# Patient Record
Sex: MALE | Race: WHITE | Employment: FULL TIME | URBAN - METROPOLITAN AREA
[De-identification: names, ages, dates, MRNs, and addresses within clinical notes are randomized per-mention and may not be internally consistent; named-entity substitution may affect disease eponyms.]

---

## 2017-05-24 PROBLEM — R00.2 PALPITATIONS: Status: ACTIVE | Noted: 2017-05-24

## 2017-05-24 PROBLEM — E78.5 DYSLIPIDEMIA: Status: ACTIVE | Noted: 2017-05-24

## 2017-06-29 PROBLEM — R06.81 WITNESSED APNEIC SPELLS: Status: ACTIVE | Noted: 2017-06-29

## 2017-06-29 PROBLEM — Z12.5 PROSTATE CANCER SCREENING: Status: ACTIVE | Noted: 2017-06-29

## 2017-06-29 PROBLEM — E79.0 HYPERURICEMIA: Status: ACTIVE | Noted: 2017-06-29

## 2017-06-29 PROBLEM — Z00.00 WELL ADULT HEALTH CHECK: Status: ACTIVE | Noted: 2017-06-29

## 2022-01-01 ENCOUNTER — HOME CARE VISIT (OUTPATIENT)
Dept: HOSPICE | Facility: HOSPICE | Age: 62
End: 2022-01-01
Payer: COMMERCIAL

## 2022-01-01 ENCOUNTER — HOME CARE VISIT (OUTPATIENT)
Dept: SCHEDULING | Facility: HOME HEALTH | Age: 62
End: 2022-01-01
Payer: COMMERCIAL

## 2022-01-01 ENCOUNTER — HOSPITAL ENCOUNTER (INPATIENT)
Age: 62
LOS: 6 days | End: 2022-04-26
Attending: INTERNAL MEDICINE | Admitting: INTERNAL MEDICINE

## 2022-01-01 ENCOUNTER — HOSPICE ADMISSION (OUTPATIENT)
Dept: HOSPICE | Facility: HOSPICE | Age: 62
End: 2022-01-01
Payer: COMMERCIAL

## 2022-01-01 VITALS
OXYGEN SATURATION: 96 % | HEART RATE: 118 BPM | DIASTOLIC BLOOD PRESSURE: 70 MMHG | HEIGHT: 69 IN | TEMPERATURE: 97.8 F | WEIGHT: 174 LBS | BODY MASS INDEX: 25.77 KG/M2 | SYSTOLIC BLOOD PRESSURE: 110 MMHG | RESPIRATION RATE: 17 BRPM

## 2022-01-01 VITALS
DIASTOLIC BLOOD PRESSURE: 76 MMHG | RESPIRATION RATE: 24 BRPM | TEMPERATURE: 97.4 F | HEART RATE: 105 BPM | SYSTOLIC BLOOD PRESSURE: 106 MMHG

## 2022-01-01 VITALS
TEMPERATURE: 97.9 F | SYSTOLIC BLOOD PRESSURE: 110 MMHG | RESPIRATION RATE: 17 BRPM | DIASTOLIC BLOOD PRESSURE: 72 MMHG | HEART RATE: 88 BPM

## 2022-01-01 VITALS
HEART RATE: 70 BPM | SYSTOLIC BLOOD PRESSURE: 102 MMHG | RESPIRATION RATE: 18 BRPM | DIASTOLIC BLOOD PRESSURE: 60 MMHG | TEMPERATURE: 97.7 F

## 2022-01-01 DIAGNOSIS — G62.0 PERIPHERAL NEUROPATHY DUE TO AND NOT CONCURRENT WITH CHEMOTHERAPY (HCC): ICD-10-CM

## 2022-01-01 DIAGNOSIS — C78.7 HEPATIC METASTASES (HCC): ICD-10-CM

## 2022-01-01 DIAGNOSIS — R64 MALIGNANT CACHEXIA (HCC): ICD-10-CM

## 2022-01-01 DIAGNOSIS — R53.81 PHYSICAL DEBILITY: ICD-10-CM

## 2022-01-01 DIAGNOSIS — R10.10 PAIN OF UPPER ABDOMEN: ICD-10-CM

## 2022-01-01 DIAGNOSIS — R11.2 REFRACTORY NAUSEA AND VOMITING: Primary | ICD-10-CM

## 2022-01-01 DIAGNOSIS — C78.89 METASTATIC ADENOCARCINOMA TO PANCREAS (HCC): ICD-10-CM

## 2022-01-01 DIAGNOSIS — C48.2 PERITONEAL CARCINOMA (HCC): ICD-10-CM

## 2022-01-01 DIAGNOSIS — T45.1X5S PERIPHERAL NEUROPATHY DUE TO AND NOT CONCURRENT WITH CHEMOTHERAPY (HCC): ICD-10-CM

## 2022-01-01 DIAGNOSIS — R18.0 MALIGNANT ASCITES: ICD-10-CM

## 2022-01-01 DIAGNOSIS — R00.2 PALPITATIONS: ICD-10-CM

## 2022-01-01 DIAGNOSIS — C79.51 METASTASIS TO BONE (HCC): ICD-10-CM

## 2022-01-01 PROCEDURE — 74011250637 HC RX REV CODE- 250/637: Performed by: INTERNAL MEDICINE

## 2022-01-01 PROCEDURE — 0651 HSPC ROUTINE HOME CARE

## 2022-01-01 PROCEDURE — 74011250636 HC RX REV CODE- 250/636: Performed by: INTERNAL MEDICINE

## 2022-01-01 PROCEDURE — 0656 HSPC GENERAL INPATIENT

## 2022-01-01 PROCEDURE — 74011000250 HC RX REV CODE- 250: Performed by: INTERNAL MEDICINE

## 2022-01-01 PROCEDURE — 3336500001 HSPC ELECTION

## 2022-01-01 PROCEDURE — 74011000250 HC RX REV CODE- 250: Performed by: NURSE PRACTITIONER

## 2022-01-01 PROCEDURE — G0299 HHS/HOSPICE OF RN EA 15 MIN: HCPCS

## 2022-01-01 PROCEDURE — 74011250637 HC RX REV CODE- 250/637: Performed by: NURSE PRACTITIONER

## 2022-01-01 PROCEDURE — 74011250636 HC RX REV CODE- 250/636: Performed by: NURSE PRACTITIONER

## 2022-01-01 PROCEDURE — G0155 HHCP-SVS OF CSW,EA 15 MIN: HCPCS

## 2022-01-01 PROCEDURE — HOSPICE MEDICATION HC HH HOSPICE MEDICATION

## 2022-01-01 RX ORDER — HALOPERIDOL 5 MG/ML
4 INJECTION INTRAMUSCULAR EVERY 8 HOURS
Status: DISCONTINUED | OUTPATIENT
Start: 2022-01-01 | End: 2022-04-27 | Stop reason: HOSPADM

## 2022-01-01 RX ORDER — HALOPERIDOL 5 MG/ML
4 INJECTION INTRAMUSCULAR ONCE
Status: COMPLETED | OUTPATIENT
Start: 2022-01-01 | End: 2022-01-01

## 2022-01-01 RX ORDER — METOCLOPRAMIDE 10 MG/1
5 TABLET ORAL
Status: DISCONTINUED | OUTPATIENT
Start: 2022-01-01 | End: 2022-01-01

## 2022-01-01 RX ORDER — ONDANSETRON 2 MG/ML
4 INJECTION INTRAMUSCULAR; INTRAVENOUS
Status: DISCONTINUED | OUTPATIENT
Start: 2022-01-01 | End: 2022-04-27 | Stop reason: HOSPADM

## 2022-01-01 RX ORDER — FACIAL-BODY WIPES
10 EACH TOPICAL AS NEEDED
Status: DISCONTINUED | OUTPATIENT
Start: 2022-01-01 | End: 2022-04-27 | Stop reason: HOSPADM

## 2022-01-01 RX ORDER — SODIUM CHLORIDE 0.9 % (FLUSH) 0.9 %
10 SYRINGE (ML) INJECTION EVERY 12 HOURS
Status: DISCONTINUED | OUTPATIENT
Start: 2022-01-01 | End: 2022-01-01 | Stop reason: DRUGHIGH

## 2022-01-01 RX ORDER — PANTOPRAZOLE SODIUM 40 MG/1
40 TABLET, DELAYED RELEASE ORAL
Status: DISCONTINUED | OUTPATIENT
Start: 2022-01-01 | End: 2022-01-01

## 2022-01-01 RX ORDER — METOCLOPRAMIDE HYDROCHLORIDE 5 MG/ML
5 INJECTION INTRAMUSCULAR; INTRAVENOUS EVERY 6 HOURS
Status: DISCONTINUED | OUTPATIENT
Start: 2022-01-01 | End: 2022-01-01

## 2022-01-01 RX ORDER — HYOSCYAMINE SULFATE 0.12 MG/ML
250 LIQUID ORAL
Status: DISCONTINUED | OUTPATIENT
Start: 2022-01-01 | End: 2022-01-01

## 2022-01-01 RX ORDER — SODIUM CHLORIDE 0.9 % (FLUSH) 0.9 %
10 SYRINGE (ML) INJECTION AS NEEDED
Status: DISCONTINUED | OUTPATIENT
Start: 2022-01-01 | End: 2022-04-27 | Stop reason: HOSPADM

## 2022-01-01 RX ORDER — SENNOSIDES 8.6 MG/1
2 TABLET ORAL 2 TIMES DAILY
Status: DISCONTINUED | OUTPATIENT
Start: 2022-01-01 | End: 2022-01-01

## 2022-01-01 RX ORDER — FENTANYL 12.5 UG/1
1 PATCH TRANSDERMAL
Status: DISCONTINUED | OUTPATIENT
Start: 2022-01-01 | End: 2022-04-27 | Stop reason: HOSPADM

## 2022-01-01 RX ORDER — FACIAL-BODY WIPES
10 EACH TOPICAL ONCE
Status: COMPLETED | OUTPATIENT
Start: 2022-01-01 | End: 2022-01-01

## 2022-01-01 RX ORDER — HEPARIN 100 UNIT/ML
500 SYRINGE INTRAVENOUS ONCE
Status: COMPLETED | OUTPATIENT
Start: 2022-01-01 | End: 2022-01-01

## 2022-01-01 RX ORDER — SODIUM CHLORIDE 0.9 % (FLUSH) 0.9 %
10 SYRINGE (ML) INJECTION EVERY 8 HOURS
Status: DISCONTINUED | OUTPATIENT
Start: 2022-01-01 | End: 2022-04-27 | Stop reason: HOSPADM

## 2022-01-01 RX ORDER — HALOPERIDOL 5 MG/ML
4 INJECTION INTRAMUSCULAR
Status: DISCONTINUED | OUTPATIENT
Start: 2022-01-01 | End: 2022-04-27 | Stop reason: HOSPADM

## 2022-01-01 RX ORDER — HEPARIN 100 UNIT/ML
300 SYRINGE INTRAVENOUS AS NEEDED
Status: DISCONTINUED | OUTPATIENT
Start: 2022-01-01 | End: 2022-01-01 | Stop reason: DRUGHIGH

## 2022-01-01 RX ORDER — MAG HYDROX/ALUMINUM HYD/SIMETH 200-200-20
30 SUSPENSION, ORAL (FINAL DOSE FORM) ORAL
Status: DISCONTINUED | OUTPATIENT
Start: 2022-01-01 | End: 2022-01-01

## 2022-01-01 RX ORDER — ONDANSETRON HYDROCHLORIDE 8 MG/1
8 TABLET, FILM COATED ORAL
COMMUNITY
Start: 2021-01-01 | End: 2022-04-27 | Stop reason: HOSPADM

## 2022-01-01 RX ORDER — NALOXEGOL OXALATE 25 MG/1
1 TABLET, FILM COATED ORAL
COMMUNITY
Start: 2022-01-01 | End: 2022-04-27 | Stop reason: HOSPADM

## 2022-01-01 RX ORDER — FENTANYL 25 UG/1
1 PATCH TRANSDERMAL
Status: DISCONTINUED | OUTPATIENT
Start: 2022-01-01 | End: 2022-01-01

## 2022-01-01 RX ORDER — HYDROMORPHONE HYDROCHLORIDE 1 MG/ML
1 INJECTION, SOLUTION INTRAMUSCULAR; INTRAVENOUS; SUBCUTANEOUS
Status: DISCONTINUED | OUTPATIENT
Start: 2022-01-01 | End: 2022-04-27 | Stop reason: HOSPADM

## 2022-01-01 RX ORDER — LORAZEPAM 2 MG/ML
1 INJECTION INTRAMUSCULAR ONCE
Status: COMPLETED | OUTPATIENT
Start: 2022-01-01 | End: 2022-01-01

## 2022-01-01 RX ORDER — CARBOXYMETHYLCELLULOSE SODIUM 10 MG/ML
1 GEL OPHTHALMIC AS NEEDED
Status: DISCONTINUED | OUTPATIENT
Start: 2022-01-01 | End: 2022-04-27 | Stop reason: HOSPADM

## 2022-01-01 RX ORDER — SODIUM CHLORIDE 0.9 % (FLUSH) 0.9 %
3 SYRINGE (ML) INJECTION EVERY 8 HOURS
Status: DISCONTINUED | OUTPATIENT
Start: 2022-01-01 | End: 2022-01-01

## 2022-01-01 RX ORDER — HEPARIN 100 UNIT/ML
300 SYRINGE INTRAVENOUS EVERY 12 HOURS
Status: DISCONTINUED | OUTPATIENT
Start: 2022-01-01 | End: 2022-01-01 | Stop reason: DRUGHIGH

## 2022-01-01 RX ORDER — DEXAMETHASONE SODIUM PHOSPHATE 4 MG/ML
4 INJECTION, SOLUTION INTRA-ARTICULAR; INTRALESIONAL; INTRAMUSCULAR; INTRAVENOUS; SOFT TISSUE EVERY 8 HOURS
Status: DISCONTINUED | OUTPATIENT
Start: 2022-01-01 | End: 2022-04-27 | Stop reason: HOSPADM

## 2022-01-01 RX ORDER — FENTANYL 25 UG/1
1 PATCH TRANSDERMAL
Status: DISCONTINUED | OUTPATIENT
Start: 2022-01-01 | End: 2022-04-27 | Stop reason: HOSPADM

## 2022-01-01 RX ORDER — DIPHENHYDRAMINE HYDROCHLORIDE 50 MG/ML
50 INJECTION, SOLUTION INTRAMUSCULAR; INTRAVENOUS
Status: DISCONTINUED | OUTPATIENT
Start: 2022-01-01 | End: 2022-01-01

## 2022-01-01 RX ORDER — HYDROMORPHONE HYDROCHLORIDE 1 MG/ML
1 INJECTION, SOLUTION INTRAMUSCULAR; INTRAVENOUS; SUBCUTANEOUS ONCE
Status: COMPLETED | OUTPATIENT
Start: 2022-01-01 | End: 2022-01-01

## 2022-01-01 RX ORDER — GLYCOPYRROLATE 0.2 MG/ML
0.2 INJECTION INTRAMUSCULAR; INTRAVENOUS
Status: DISCONTINUED | OUTPATIENT
Start: 2022-01-01 | End: 2022-04-27 | Stop reason: HOSPADM

## 2022-01-01 RX ADMIN — HALOPERIDOL LACTATE 4 MG: 5 INJECTION, SOLUTION INTRAMUSCULAR at 12:01

## 2022-01-01 RX ADMIN — SODIUM CHLORIDE, PRESERVATIVE FREE 10 ML: 5 INJECTION INTRAVENOUS at 12:00

## 2022-01-01 RX ADMIN — DIPHENHYDRAMINE HYDROCHLORIDE 50 MG: 50 INJECTION, SOLUTION INTRAMUSCULAR; INTRAVENOUS at 14:09

## 2022-01-01 RX ADMIN — SODIUM CHLORIDE, PRESERVATIVE FREE 10 ML: 5 INJECTION INTRAVENOUS at 20:53

## 2022-01-01 RX ADMIN — METOCLOPRAMIDE HYDROCHLORIDE 5 MG: 5 INJECTION INTRAMUSCULAR; INTRAVENOUS at 18:30

## 2022-01-01 RX ADMIN — HYDROMORPHONE HYDROCHLORIDE 1 MG: 1 INJECTION, SOLUTION INTRAMUSCULAR; INTRAVENOUS; SUBCUTANEOUS at 05:07

## 2022-01-01 RX ADMIN — Medication 3 ML: at 13:26

## 2022-01-01 RX ADMIN — SODIUM CHLORIDE, PRESERVATIVE FREE 10 ML: 5 INJECTION INTRAVENOUS at 11:31

## 2022-01-01 RX ADMIN — SODIUM CHLORIDE, PRESERVATIVE FREE 10 ML: 5 INJECTION INTRAVENOUS at 15:04

## 2022-01-01 RX ADMIN — HYDROMORPHONE HYDROCHLORIDE 1 MG: 1 INJECTION, SOLUTION INTRAMUSCULAR; INTRAVENOUS; SUBCUTANEOUS at 06:25

## 2022-01-01 RX ADMIN — METOCLOPRAMIDE HYDROCHLORIDE 5 MG: 5 INJECTION INTRAMUSCULAR; INTRAVENOUS at 06:11

## 2022-01-01 RX ADMIN — SODIUM CHLORIDE, PRESERVATIVE FREE 10 ML: 5 INJECTION INTRAVENOUS at 21:06

## 2022-01-01 RX ADMIN — SODIUM CHLORIDE, PRESERVATIVE FREE 10 ML: 5 INJECTION INTRAVENOUS at 08:21

## 2022-01-01 RX ADMIN — SODIUM CHLORIDE, PRESERVATIVE FREE 10 ML: 5 INJECTION INTRAVENOUS at 02:31

## 2022-01-01 RX ADMIN — SODIUM CHLORIDE, PRESERVATIVE FREE 10 ML: 5 INJECTION INTRAVENOUS at 11:56

## 2022-01-01 RX ADMIN — METOCLOPRAMIDE HYDROCHLORIDE 5 MG: 5 INJECTION INTRAMUSCULAR; INTRAVENOUS at 18:13

## 2022-01-01 RX ADMIN — SODIUM CHLORIDE, PRESERVATIVE FREE 10 ML: 5 INJECTION INTRAVENOUS at 08:22

## 2022-01-01 RX ADMIN — LORAZEPAM 1 MG: 2 INJECTION INTRAMUSCULAR; INTRAVENOUS at 15:03

## 2022-01-01 RX ADMIN — HYDROMORPHONE HYDROCHLORIDE 1 MG: 1 INJECTION, SOLUTION INTRAMUSCULAR; INTRAVENOUS; SUBCUTANEOUS at 15:09

## 2022-01-01 RX ADMIN — DEXAMETHASONE SODIUM PHOSPHATE 4 MG: 4 INJECTION, SOLUTION INTRAMUSCULAR; INTRAVENOUS at 21:54

## 2022-01-01 RX ADMIN — SODIUM CHLORIDE, PRESERVATIVE FREE 10 ML: 5 INJECTION INTRAVENOUS at 16:32

## 2022-01-01 RX ADMIN — Medication 3 ML: at 21:07

## 2022-01-01 RX ADMIN — DEXAMETHASONE SODIUM PHOSPHATE 4 MG: 4 INJECTION, SOLUTION INTRAMUSCULAR; INTRAVENOUS at 21:21

## 2022-01-01 RX ADMIN — SODIUM CHLORIDE, PRESERVATIVE FREE 10 ML: 5 INJECTION INTRAVENOUS at 18:27

## 2022-01-01 RX ADMIN — DEXAMETHASONE SODIUM PHOSPHATE 4 MG: 4 INJECTION, SOLUTION INTRAMUSCULAR; INTRAVENOUS at 06:04

## 2022-01-01 RX ADMIN — SODIUM CHLORIDE, PRESERVATIVE FREE 10 ML: 5 INJECTION INTRAVENOUS at 06:04

## 2022-01-01 RX ADMIN — HALOPERIDOL LACTATE 4 MG: 5 INJECTION, SOLUTION INTRAMUSCULAR at 02:31

## 2022-01-01 RX ADMIN — METOCLOPRAMIDE 5 MG: 10 TABLET ORAL at 10:48

## 2022-01-01 RX ADMIN — DEXAMETHASONE SODIUM PHOSPHATE 4 MG: 4 INJECTION, SOLUTION INTRAMUSCULAR; INTRAVENOUS at 14:42

## 2022-01-01 RX ADMIN — HYDROMORPHONE HYDROCHLORIDE 1 MG: 1 INJECTION, SOLUTION INTRAMUSCULAR; INTRAVENOUS; SUBCUTANEOUS at 17:31

## 2022-01-01 RX ADMIN — HALOPERIDOL LACTATE 4 MG: 5 INJECTION, SOLUTION INTRAMUSCULAR at 12:53

## 2022-01-01 RX ADMIN — BISACODYL 10 MG: 10 SUPPOSITORY RECTAL at 12:23

## 2022-01-01 RX ADMIN — HALOPERIDOL LACTATE 4 MG: 5 INJECTION, SOLUTION INTRAMUSCULAR at 16:32

## 2022-01-01 RX ADMIN — SODIUM CHLORIDE, PRESERVATIVE FREE 10 ML: 5 INJECTION INTRAVENOUS at 00:14

## 2022-01-01 RX ADMIN — DEXAMETHASONE SODIUM PHOSPHATE 4 MG: 4 INJECTION, SOLUTION INTRAMUSCULAR; INTRAVENOUS at 15:23

## 2022-01-01 RX ADMIN — HALOPERIDOL LACTATE 4 MG: 5 INJECTION, SOLUTION INTRAMUSCULAR at 21:05

## 2022-01-01 RX ADMIN — SODIUM CHLORIDE, PRESERVATIVE FREE 10 ML: 5 INJECTION INTRAVENOUS at 15:24

## 2022-01-01 RX ADMIN — HYDROMORPHONE HYDROCHLORIDE 1 MG: 1 INJECTION, SOLUTION INTRAMUSCULAR; INTRAVENOUS; SUBCUTANEOUS at 15:03

## 2022-01-01 RX ADMIN — DEXAMETHASONE SODIUM PHOSPHATE 4 MG: 4 INJECTION, SOLUTION INTRAMUSCULAR; INTRAVENOUS at 21:06

## 2022-01-01 RX ADMIN — Medication 3 ML: at 15:00

## 2022-01-01 RX ADMIN — HALOPERIDOL LACTATE 4 MG: 5 INJECTION, SOLUTION INTRAMUSCULAR at 04:58

## 2022-01-01 RX ADMIN — HALOPERIDOL LACTATE 4 MG: 5 INJECTION, SOLUTION INTRAMUSCULAR at 18:50

## 2022-01-01 RX ADMIN — SODIUM CHLORIDE, PRESERVATIVE FREE 10 ML: 5 INJECTION INTRAVENOUS at 03:45

## 2022-01-01 RX ADMIN — METOCLOPRAMIDE HYDROCHLORIDE 5 MG: 5 INJECTION INTRAMUSCULAR; INTRAVENOUS at 13:20

## 2022-01-01 RX ADMIN — HYDROMORPHONE HYDROCHLORIDE 1 MG: 1 INJECTION, SOLUTION INTRAMUSCULAR; INTRAVENOUS; SUBCUTANEOUS at 07:11

## 2022-01-01 RX ADMIN — HALOPERIDOL LACTATE 4 MG: 5 INJECTION, SOLUTION INTRAMUSCULAR at 20:53

## 2022-01-01 RX ADMIN — SODIUM CHLORIDE, PRESERVATIVE FREE 10 ML: 5 INJECTION INTRAVENOUS at 06:25

## 2022-01-01 RX ADMIN — SODIUM CHLORIDE, PRESERVATIVE FREE 10 ML: 5 INJECTION INTRAVENOUS at 14:42

## 2022-01-01 RX ADMIN — CARBOXYMETHYLCELLULOSE SODIUM 1 DROP: 10 GEL OPHTHALMIC at 15:12

## 2022-01-01 RX ADMIN — SODIUM CHLORIDE, PRESERVATIVE FREE 10 ML: 5 INJECTION INTRAVENOUS at 06:12

## 2022-01-01 RX ADMIN — SODIUM CHLORIDE, PRESERVATIVE FREE 10 ML: 5 INJECTION INTRAVENOUS at 07:12

## 2022-01-01 RX ADMIN — HYDROMORPHONE HYDROCHLORIDE 1 MG: 1 INJECTION, SOLUTION INTRAMUSCULAR; INTRAVENOUS; SUBCUTANEOUS at 11:53

## 2022-01-01 RX ADMIN — HYDROMORPHONE HYDROCHLORIDE 1 MG: 1 INJECTION, SOLUTION INTRAMUSCULAR; INTRAVENOUS; SUBCUTANEOUS at 20:47

## 2022-01-01 RX ADMIN — SODIUM CHLORIDE, PRESERVATIVE FREE 10 ML: 5 INJECTION INTRAVENOUS at 00:04

## 2022-01-01 RX ADMIN — DEXAMETHASONE SODIUM PHOSPHATE 4 MG: 4 INJECTION, SOLUTION INTRAMUSCULAR; INTRAVENOUS at 21:08

## 2022-01-01 RX ADMIN — PANTOPRAZOLE SODIUM 40 MG: 40 TABLET, DELAYED RELEASE ORAL at 16:59

## 2022-01-01 RX ADMIN — HALOPERIDOL LACTATE 4 MG: 5 INJECTION, SOLUTION INTRAMUSCULAR at 05:06

## 2022-01-01 RX ADMIN — SODIUM CHLORIDE, PRESERVATIVE FREE 10 ML: 5 INJECTION INTRAVENOUS at 04:59

## 2022-01-01 RX ADMIN — DEXAMETHASONE SODIUM PHOSPHATE 4 MG: 4 INJECTION, SOLUTION INTRAMUSCULAR; INTRAVENOUS at 21:12

## 2022-01-01 RX ADMIN — SENNOSIDES 17.2 MG: 8.6 TABLET, FILM COATED ORAL at 10:48

## 2022-01-01 RX ADMIN — SODIUM CHLORIDE, PRESERVATIVE FREE 300 UNITS: 5 INJECTION INTRAVENOUS at 20:49

## 2022-01-01 RX ADMIN — PANTOPRAZOLE SODIUM 40 MG: 40 TABLET, DELAYED RELEASE ORAL at 08:21

## 2022-01-01 RX ADMIN — SODIUM CHLORIDE, PRESERVATIVE FREE 10 ML: 5 INJECTION INTRAVENOUS at 15:12

## 2022-01-01 RX ADMIN — ONDANSETRON 4 MG: 2 INJECTION INTRAMUSCULAR; INTRAVENOUS at 18:26

## 2022-01-01 RX ADMIN — HALOPERIDOL LACTATE 4 MG: 5 INJECTION, SOLUTION INTRAMUSCULAR at 11:31

## 2022-01-01 RX ADMIN — DEXAMETHASONE SODIUM PHOSPHATE 4 MG: 4 INJECTION, SOLUTION INTRAMUSCULAR; INTRAVENOUS at 15:03

## 2022-01-01 RX ADMIN — SODIUM CHLORIDE, PRESERVATIVE FREE 300 UNITS: 5 INJECTION INTRAVENOUS at 12:23

## 2022-01-01 RX ADMIN — HYDROMORPHONE HYDROCHLORIDE 1 MG: 1 INJECTION, SOLUTION INTRAMUSCULAR; INTRAVENOUS; SUBCUTANEOUS at 05:14

## 2022-01-01 RX ADMIN — Medication 3 ML: at 05:07

## 2022-01-01 RX ADMIN — SODIUM CHLORIDE, PRESERVATIVE FREE 10 ML: 5 INJECTION INTRAVENOUS at 12:23

## 2022-01-01 RX ADMIN — SODIUM CHLORIDE, PRESERVATIVE FREE 300 UNITS: 5 INJECTION INTRAVENOUS at 14:12

## 2022-01-01 RX ADMIN — DEXAMETHASONE SODIUM PHOSPHATE 4 MG: 4 INJECTION, SOLUTION INTRAMUSCULAR; INTRAVENOUS at 15:17

## 2022-01-01 RX ADMIN — DEXAMETHASONE SODIUM PHOSPHATE 4 MG: 4 INJECTION, SOLUTION INTRAMUSCULAR; INTRAVENOUS at 05:00

## 2022-01-01 RX ADMIN — HYDROMORPHONE HYDROCHLORIDE 1 MG: 1 INJECTION, SOLUTION INTRAMUSCULAR; INTRAVENOUS; SUBCUTANEOUS at 21:06

## 2022-01-01 RX ADMIN — Medication 10 ML: at 05:54

## 2022-01-01 RX ADMIN — HALOPERIDOL LACTATE 4 MG: 5 INJECTION, SOLUTION INTRAMUSCULAR at 11:55

## 2022-01-01 RX ADMIN — Medication 10 ML: at 21:22

## 2022-01-01 RX ADMIN — DEXAMETHASONE SODIUM PHOSPHATE 4 MG: 4 INJECTION, SOLUTION INTRAMUSCULAR; INTRAVENOUS at 05:54

## 2022-01-01 RX ADMIN — SODIUM CHLORIDE, PRESERVATIVE FREE 300 UNITS: 5 INJECTION INTRAVENOUS at 08:22

## 2022-01-01 RX ADMIN — SODIUM CHLORIDE, PRESERVATIVE FREE 10 ML: 5 INJECTION INTRAVENOUS at 18:50

## 2022-01-01 RX ADMIN — HALOPERIDOL LACTATE 4 MG: 5 INJECTION, SOLUTION INTRAMUSCULAR at 03:45

## 2022-01-01 RX ADMIN — SODIUM CHLORIDE, PRESERVATIVE FREE 10 ML: 5 INJECTION INTRAVENOUS at 21:12

## 2022-01-01 RX ADMIN — SODIUM CHLORIDE, PRESERVATIVE FREE 10 ML: 5 INJECTION INTRAVENOUS at 17:31

## 2022-01-01 RX ADMIN — SODIUM CHLORIDE, PRESERVATIVE FREE 10 ML: 5 INJECTION INTRAVENOUS at 14:28

## 2022-01-01 RX ADMIN — Medication 3 ML: at 15:17

## 2022-01-01 RX ADMIN — HALOPERIDOL LACTATE 4 MG: 5 INJECTION, SOLUTION INTRAMUSCULAR at 12:08

## 2022-01-01 RX ADMIN — DEXAMETHASONE SODIUM PHOSPHATE 4 MG: 4 INJECTION, SOLUTION INTRAMUSCULAR; INTRAVENOUS at 14:28

## 2022-01-01 RX ADMIN — METOCLOPRAMIDE HYDROCHLORIDE 5 MG: 5 INJECTION INTRAMUSCULAR; INTRAVENOUS at 00:04

## 2022-01-01 RX ADMIN — METOCLOPRAMIDE HYDROCHLORIDE 5 MG: 5 INJECTION INTRAMUSCULAR; INTRAVENOUS at 06:04

## 2022-01-01 RX ADMIN — DEXAMETHASONE SODIUM PHOSPHATE 4 MG: 4 INJECTION, SOLUTION INTRAMUSCULAR; INTRAVENOUS at 06:11

## 2022-01-01 RX ADMIN — SODIUM CHLORIDE, PRESERVATIVE FREE 10 ML: 5 INJECTION INTRAVENOUS at 18:31

## 2022-01-01 RX ADMIN — HYDROMORPHONE HYDROCHLORIDE 1 MG: 1 INJECTION, SOLUTION INTRAMUSCULAR; INTRAVENOUS; SUBCUTANEOUS at 14:09

## 2022-01-01 RX ADMIN — Medication 10 ML: at 05:07

## 2022-01-01 RX ADMIN — HALOPERIDOL LACTATE 4 MG: 5 INJECTION, SOLUTION INTRAMUSCULAR at 21:06

## 2022-01-01 RX ADMIN — DIPHENHYDRAMINE HYDROCHLORIDE 50 MG: 50 INJECTION, SOLUTION INTRAMUSCULAR; INTRAVENOUS at 00:14

## 2022-01-01 RX ADMIN — METOCLOPRAMIDE HYDROCHLORIDE 5 MG: 5 INJECTION INTRAMUSCULAR; INTRAVENOUS at 11:59

## 2022-01-01 RX ADMIN — Medication 10 ML: at 21:54

## 2022-01-01 RX ADMIN — HALOPERIDOL LACTATE 4 MG: 5 INJECTION, SOLUTION INTRAMUSCULAR at 16:46

## 2022-01-01 RX ADMIN — DEXAMETHASONE SODIUM PHOSPHATE 4 MG: 4 INJECTION, SOLUTION INTRAMUSCULAR; INTRAVENOUS at 05:07

## 2022-01-01 RX ADMIN — DEXAMETHASONE SODIUM PHOSPHATE 4 MG: 4 INJECTION, SOLUTION INTRAMUSCULAR; INTRAVENOUS at 05:08

## 2022-01-01 RX ADMIN — Medication 500 UNITS: at 13:00

## 2022-01-01 RX ADMIN — SODIUM CHLORIDE, PRESERVATIVE FREE 10 ML: 5 INJECTION INTRAVENOUS at 16:47

## 2022-01-01 RX ADMIN — SODIUM CHLORIDE, PRESERVATIVE FREE 10 ML: 5 INJECTION INTRAVENOUS at 14:08

## 2022-01-01 RX ADMIN — SODIUM CHLORIDE, PRESERVATIVE FREE 10 ML: 5 INJECTION INTRAVENOUS at 05:13

## 2022-01-01 RX ADMIN — HYDROMORPHONE HYDROCHLORIDE 1 MG: 1 INJECTION, SOLUTION INTRAMUSCULAR; INTRAVENOUS; SUBCUTANEOUS at 20:53

## 2022-01-01 RX ADMIN — DEXAMETHASONE SODIUM PHOSPHATE 4 MG: 4 INJECTION, SOLUTION INTRAMUSCULAR; INTRAVENOUS at 21:19

## 2022-01-01 RX ADMIN — DEXAMETHASONE SODIUM PHOSPHATE 4 MG: 4 INJECTION, SOLUTION INTRAMUSCULAR; INTRAVENOUS at 13:26

## 2022-01-01 RX ADMIN — DEXAMETHASONE SODIUM PHOSPHATE 4 MG: 4 INJECTION, SOLUTION INTRAMUSCULAR; INTRAVENOUS at 14:08

## 2022-01-01 RX ADMIN — PANTOPRAZOLE SODIUM 40 MG: 40 TABLET, DELAYED RELEASE ORAL at 07:32

## 2022-01-01 RX ADMIN — HYDROMORPHONE HYDROCHLORIDE 1 MG: 1 INJECTION, SOLUTION INTRAMUSCULAR; INTRAVENOUS; SUBCUTANEOUS at 16:31

## 2022-01-01 RX ADMIN — ALUMINUM HYDROXIDE, MAGNESIUM HYDROXIDE, AND SIMETHICONE 30 ML: 200; 200; 20 SUSPENSION ORAL at 16:59

## 2022-01-01 RX ADMIN — SODIUM CHLORIDE, PRESERVATIVE FREE 300 UNITS: 5 INJECTION INTRAVENOUS at 21:19

## 2022-01-01 RX ADMIN — HYDROMORPHONE HYDROCHLORIDE 1 MG: 1 INJECTION, SOLUTION INTRAMUSCULAR; INTRAVENOUS; SUBCUTANEOUS at 16:46

## 2022-01-01 RX ADMIN — SODIUM CHLORIDE, PRESERVATIVE FREE 10 ML: 5 INJECTION INTRAVENOUS at 12:54

## 2022-01-01 RX ADMIN — SODIUM CHLORIDE, PRESERVATIVE FREE 10 ML: 5 INJECTION INTRAVENOUS at 21:19

## 2022-01-01 RX ADMIN — SODIUM CHLORIDE, PRESERVATIVE FREE 10 ML: 5 INJECTION INTRAVENOUS at 20:50

## 2022-01-01 RX ADMIN — HALOPERIDOL LACTATE 4 MG: 5 INJECTION, SOLUTION INTRAMUSCULAR at 15:10

## 2022-01-01 RX ADMIN — SODIUM CHLORIDE, PRESERVATIVE FREE 10 ML: 5 INJECTION INTRAVENOUS at 18:14

## 2022-04-16 NOTE — HOSPICE
Patient is a 63year old male with a primary dx of metastatic adenocarcinoma of pancreas. KPS/PPS 40. ECOG 3. Patient resides at home with his spouse/PCG, Ginny Stoner. Patient signed EOB form; however, Ginny Stoner signed remaining [facility] consents and patient DNR r/t patient peripheral neuropathy s/p chemotherapy. Patient screened negative for COVID-19 on 4/15/2022; vaccination status unknown. Patient presented to [facility] ER on 9/19/2021 with epigastric pain, GERD, generalized weakness, and lightheadedness. Patient states he knew he had Pancreatic Cancer prior to diagnosis due to his mother succumbing to Pancreatic Cancer years ago. A CT of C/A/P performed and found pancreas body/tail mass, omental nodularity, and irregular ground glass and solid pulmonary nodules in lungs concerning for metastatic pancreas cancer. Tissue apposition performed via EGD and EUS, as well as celiac plexus block. MRI on 9/28/21 confirmed bilobar hepatic metastases and peritoneal carcinomatosis. Patient was started on Folfirinox in October 2021 with mixed response over the last several months. This was d/c at last appointment on 4/14/22 r/t performance status remaining compromised AEB debilitating fatigue and weakness. Dr. Andreas Hewitt at [facility] stated that he unfortunately did not feel patient would be a treatment candidate again in the future. Patient and spouse in agreement, and patient wishes to remain comfortable in his own home. Of note, patient received paracentesis on 4/8/22 and was diagnosed with malignant ascites with possible superimposed SBP; started on Cipro with three days left remaining of abx treatment at present. Patient's main complaints are GERD and malignancy associated pain. Patient's GERD is being treated with Reglan and Protonix. Malignancy associated pain being treated with oxycodone IR 5mg q4h PRN for severe pain and Tylenol 500mg q4h PRN mild pain or fever.  Oxy IR was recently increased to 10mg q4h PRN for severe pain; however, patient stated this caused increased drowsiness, so he cut back to 5mg q4h PRN. He will now be taking Oxy IR 10mg scheduled at bedtime due to pain most frequently occurring during the night and disrupting sleep. Patient has not had a BM since this past Monday; increased senna-s to 2 tabs BID. Patient will attempt to drink warm prune juice tomorrow morning. Appetite is poor at present. Patient eating approximately three snacks per day, such as pudding, yogurt, and applesauce. No shortness of breath noted or reported. Spouse providing excellent care of patient. Patient and spouse in agreement with POC. Patient appropriate for hospice services. RN educated patient and spouse on hospice process and philosophy, medication management, pain control, skin care and protection, fall precautions, anxiety strategies, home safety, and social distancing due to COVID-19. Pt history, assessment, and status reviewed with patient's hospice attending MD, Dr. Annabelle Butterfield. Medications reconciled with NP, Leona Mayberry. Patient and spouse made aware of volunteer services but pending at this time due to COVID-19; plans to reassess volunteer needs once volunteer services become available. HHA declined at present; patient states he will inform RN/CM if hospice aide is wanted in the future. SW and SC services accepted. Caregiver made aware that an RN will be completing a follow-up visit within 24 hours. Handwritten medication list, [facility] book and magnet with [facility] phone number left in home. RN educated Caregiver to call [facility] 24/7 phone line with any changes, concerns, or falls with verbalized understanding. Medication: No medications needed for delivery at present. Supplies: No supplies needed at present. DME: rollator, bath bench, and toilet seat riser to be ordered from [facility] with requested delivery date of 4/17/2022.

## 2022-04-19 NOTE — HOSPICE
Pt is 63 y.o.male with Adenocarcinoanoma of pancreas. Pt lives in his home with the assistance of spouse. At present pt requires moderate assistance. Spouse is assisting with all needs at this time. Explained 's role and advised of community resources. Spoke with spouse regarding end of life care and plans are to remain home. Pt in bed asleep and slept throughout visit. Spouse verbalized pt sleepng more and eating what he can tolerate. Pain is a concern and have meds at home. Spouse is requesting a bedside commode and LMSW will assist with ordering. Encouraged spouse to vent feelings and offered emotional support. Plans are to utilize [employer] to assist with arrangements. Pt has applied for SS benefits and may need assistance with completing documents. LMSW answered questions regarding benefits and spouse will follow up with social security office. praised spouse for providing good care and encouraged to continue. Reviewed emergency careplan and encouraged to contact hospice as needed. All voiced understanding.

## 2022-04-20 PROBLEM — C25.1 CANCER, PANCREAS, BODY (HCC): Status: ACTIVE | Noted: 2022-01-01

## 2022-04-20 PROBLEM — R11.2 REFRACTORY NAUSEA AND VOMITING: Status: ACTIVE | Noted: 2022-01-01

## 2022-04-20 PROBLEM — R06.81 WITNESSED APNEIC SPELLS: Status: RESOLVED | Noted: 2017-06-29 | Resolved: 2022-01-01

## 2022-04-20 PROBLEM — E78.5 DYSLIPIDEMIA: Chronic | Status: RESOLVED | Noted: 2017-05-24 | Resolved: 2022-01-01

## 2022-04-20 PROBLEM — Z00.00 WELL ADULT HEALTH CHECK: Status: RESOLVED | Noted: 2017-06-29 | Resolved: 2022-01-01

## 2022-04-20 PROBLEM — G62.0 PERIPHERAL NEUROPATHY DUE TO AND NOT CONCURRENT WITH CHEMOTHERAPY (HCC): Status: ACTIVE | Noted: 2022-01-01

## 2022-04-20 PROBLEM — R64 MALIGNANT CACHEXIA (HCC): Status: ACTIVE | Noted: 2022-01-01

## 2022-04-20 PROBLEM — R10.9 ABDOMINAL PAIN: Status: ACTIVE | Noted: 2022-01-01

## 2022-04-20 PROBLEM — C25.1 MALIGNANT NEOPLASM OF BODY OF PANCREAS (HCC): Status: ACTIVE | Noted: 2022-01-01

## 2022-04-20 PROBLEM — K86.89 PANCREATIC MASS: Status: ACTIVE | Noted: 2021-01-01

## 2022-04-20 PROBLEM — C78.89 METASTATIC ADENOCARCINOMA TO PANCREAS (HCC): Status: ACTIVE | Noted: 2021-01-01

## 2022-04-20 PROBLEM — C48.2 PERITONEAL CARCINOMA (HCC): Status: ACTIVE | Noted: 2022-01-01

## 2022-04-20 PROBLEM — E79.0 HYPERURICEMIA: Status: RESOLVED | Noted: 2017-06-29 | Resolved: 2022-01-01

## 2022-04-20 PROBLEM — T45.1X5S PERIPHERAL NEUROPATHY DUE TO AND NOT CONCURRENT WITH CHEMOTHERAPY (HCC): Status: ACTIVE | Noted: 2022-01-01

## 2022-04-20 PROBLEM — R18.0 MALIGNANT ASCITES: Status: ACTIVE | Noted: 2022-01-01

## 2022-04-20 PROBLEM — Z12.5 PROSTATE CANCER SCREENING: Status: RESOLVED | Noted: 2017-06-29 | Resolved: 2022-01-01

## 2022-04-20 PROBLEM — R53.81 PHYSICAL DEBILITY: Status: ACTIVE | Noted: 2022-01-01

## 2022-04-20 NOTE — PROGRESS NOTES
1200- Pt arrives via [address] [place] [address] for Pancreatic Cancer, symptom management is for pain, nausea, and vomiting. Pt is alert and oriented x 4 c/o pain and nausea. He states that he does not have any energy and feels fatigued. Pt states that he has uncontrollable vomiting and a dry mouth. Pain rated 5/10 in lower back. Last oral intake was a small amount of pudding on 4/19/2022 and no liquids. Pt to have 1 tsp of ice chips every 30 minutes other than that NPO. Pts breathing is regular non-labored, port on right side of chest. Port has not been used in >30d. Accessed, flushed with saline/heparin and patent. Pt given haldol for nausea, and dilaudid for pain, flushed with NS. Abdomen distended with ascites, increased pain on palpation. Pt states his last bowel movement was 7 days prior. Bowel sounds hypoactive. Pts able to control bowel and bladder and is up to bedside commode. Pt wears a brief at times but does not have one on currently. Spine and buttocks redness noted. No breaks in skin, and blanchable. Pt is on a regular mattress. Upper extremities no edema noted, lower extremities +1 pitting edema in the feet only. Pt moves all extremities x4 with intent. Pt has a fentanyl 25mcg patch on right upper arm, placed by [facility] home RN, with orders to keep this patch in place. Pt NOK, Spouse Em Clements at bedside. Pts family oriented to [facility]. 1240 - Pt does not have any complaints at this. In bed no grimacing, no moaning. 1307 - Pt sitting on the side of the bed actively vomiting. Wife at bedside assisting the patient. MD notified for additional PRN medication per Dr. Kris Lyn provide Scheduled medication for nausea. Pt denies any pain at this time but continues to be nauseated. 1410- Pt was grimacing, legs restless, seemed to not be able to find a comfortable position between the bed/chair. Pt states he is fluctuating between hot/cold. PRN and routine medication given via Port.  Pt resting/sleeping prior to leaving the room. No other needs at this time. Pts wife stepped out of the facility, but states she will return. FLACC-7    1440- Pt in bed resting/sleeping. No moaning, grimacing. No vomiting at this time. 36- Pt initially sleeping, wife at bedside. While conversing the patient startled awake. He states that he feels foggy but is not having any pain or nausea at this time. Pt assisted to the edge of the bed, and wife is at bedside with him. 1630- Pt resting comfortably. No needs at this time. 1806 - Pt resting in bed comfortably, alert to voice. Pt denies pain and nausea at this time. Repositioned in bed, after repositioning the patient he does tend to get nauseated. Bed in low, locked position, side rails x 2. Tab alarm in place, call light within reach. Report given to [facility].

## 2022-04-20 NOTE — HSPC IDG CHAPLAIN NOTES
Patient: Barbi Clements    Date: 04/20/22  Time: 2:56 PM    [facility]  Notes    / Grief Counselor has reviewed  Initial Comprehensive Assessment and plan of care. Bereavement and Spiritual Care Assessments to be completed and plan of care put in place to meet patient and family needs.          Signed by: Griffin Cowden

## 2022-04-20 NOTE — PROGRESS NOTES
1200- Pt arrives via [address] [place] [address] for Pancreatic Cancer, symptom management is for pain, nausea, and vomiting. Pt is alert and oriented x 4 c/o pain and nausea. He states that he does not have any energy and feels fatigued. Pt states that he has uncontrollable vomiting and a dry mouth. Pain rated 5/10 in lower back. Last oral intake was a small amount of pudding on 4/19/2022 and no liquids. Pt to have 1 tsp of ice chips every 30 minutes other than that NPO. Pts breathing is regular non-labored, port on right side of chest. Port has not been used in >30d. Accessed, flushed with saline/heparin and patent. Pt given haldol for nausea, and dilaudid for pain, flushed with NS. Abdomen distended with ascites, increased pain on palpation. Pt states his last bowel movement was 7 days prior. Bowel sounds hypoactive. Pts able to control bowel and bladder and is up to bedside commode. Pt wears a brief at times but does not have one on currently. Spine and buttocks redness noted. No breaks in skin, and blanchable. Pt is on a regular mattress. Upper extremities no edema noted, lower extremities +1 pitting edema in the feet only. Pt moves all extremities x4 with intent. Pt has a fentanyl 25mcg patch on right upper arm, placed by [facility] home RN, with orders to keep this patch in place. Pt NOK, Spouse Em Clements at bedside. Pts family oriented to [facility]. 1240 - Pt does not have any complaints at this. In bed no grimacing, no moaning. 1307 - Pt sitting on the side of the bed actively vomiting. Wife at bedside assisting the patient. MD notified for additional PRN medication per Dr. Church Ban provide Scheduled medication for nausea. Pt denies any pain at this time but continues to be nauseated. 1410- Pt was grimacing, legs restless, seemed to not be able to find a comfortable position between the bed/chair. Pt states he is fluctuating between hot/cold. PRN and routine medication given via Port.  Pt resting/sleeping prior to leaving the room. No other needs at this time. Pts wife stepped out of the facility, but states she will return. FLACC-7    1440- Pt in bed resting/sleeping. No moaning, grimacing. No vomiting at this time. 36- Pt initially sleeping, wife at bedside. While conversing the patient startled awake. He states that he feels foggy but is not having any pain or nausea at this time. Pt assisted to the edge of the bed, and wife is at bedside with him.

## 2022-04-20 NOTE — HSPC IDG NURSE NOTES
Patient: Clay Clements    Date: 04/20/22  Time: 12:46 PM    [facility] Nurse Notes  Pt arrives via [address] [facility] for Pancreatic Cancer, symptom management is for pain, nausea, and vomiting. Pt is alert and oriented x 4 c/o pain and nausea. He states that he does not have any energy and feels fatigued. Pt states that he has uncontrollable vomiting and a dry mouth. Last oral intake was a small amount of pudding on 4/19/2022 and no liquids. Pt to have 1 tsp of ice chips every 30 minutes other than that NPO. Pts breathing is regular non-labored, port on right side of chest. Port has not been used in >30d. Accessed, flushed with saline/heparin and patent. Abdomen distended with ascites, increased pain on palpation. Pt states his last bowel movement was 7 days prior. Bowel sounds hypoactive. Pts able to control bowel and bladder and is up to bedside commode. Pt wears a brief at times but does not have one on currently. Spine and buttocks redness noted. No breaks in skin, and blanchable. Pt is on a regular mattress. Upper extremities no edema noted, lower extremities +1 pitting edema in the feet only. Pt moves all extremities x4 with intent. Pt has a fentanyl 25mcg patch on right upper arm, placed by [facility] home RN, with orders to keep this patch in place. Pt NOK, Spouse Em Clements at bedside. Pts family oriented to [facility].     Signed by: James Chavis RN

## 2022-04-20 NOTE — HSPC IDG SOCIAL WORKER NOTES
Patient: Timmy Clements    Date: 04/20/22  Time: 12:45 PM    [facility]  Notes  SW has read the initial comprehensive assessment and plan of care. No immediate needs noted. Initial SW assessment visit will be completed within 5 days of admission.          Signed by: Dyan Herrera LMSW

## 2022-04-20 NOTE — [facility] IDG MASTER NOTE
Hospice Interdisciplinary Group Collaborative  Date: 04/20/22  Time: 3:04 PM    ___________________    Patient: Caryl Clements  Coverage Information:     Payor: JOAN     Plan: BSHSI JOAN MASSEY     Subscriber ID: [id number]     Phone Number:   MRN: [id number]  CCN:   [place] Claim No. :     Hospice Election Date:   Current Benefit Period: Benefit Period 1  Start Date: 4/15/2022  End Date: 7/13/2022      Medical Director:   Hospice Attending Provider: Deja Estrella MD  [address] [place] [address]  [id number]  Phone: [phone]  Fax: [phone]    Level of Care: Routine Home Care      ___________________    Diagnoses: The primary encounter diagnosis was Refractory nausea and vomiting. Diagnoses of Malignant ascites, Pain of upper abdomen, Peritoneal carcinoma ([facility] [address] Metastatic adenocarcinoma to pancreas [facility]), Malignant cachexia ([facility] [address] Peripheral neuropathy due to and not concurrent with chemotherapy [facility]), Palpitations, Hepatic metastases ([facility] [address] Physical debility, and Metastasis to bone [facility]) were also pertinent to this visit.     Current Medications:    Current Facility-Administered Medications:     sodium chloride (NS) flush 10 mL, 10 mL, InterCATHeter, Q12H, Deja Estrella MD, 10 mL at 04/20/22 1408    heparin (porcine) pf 300 Units, 300 Units, InterCATHeter, Q12H, Deja Estrella MD, 300 Units at 04/20/22 1412    sodium chloride (NS) flush 10 mL, 10 mL, InterCATHeter, PRN, Deja Estrella MD    heparin (porcine) pf 300 Units, 300 Units, InterCATHeter, PRN, Deja Estrella MD    HYDROmorphone (DILAUDID) injection 1 mg, 1 mg, IntraVENous, Q30MIN PRN, 1 mg at 04/20/22 1409 **OR** HYDROmorphone (DILAUDID) injection 1 mg, 1 mg, SubCUTAneous, Q30MIN PRN, Deja Estrella MD    haloperidol lactate (HALDOL) injection 4 mg, 4 mg, IntraVENous, Q1H PRN **OR** haloperidol lactate (HALDOL) injection 4 mg, 4 mg, SubCUTAneous, Q1H PRN, Deja Estrella MD    bisacodyL (DULCOLAX) suppository 10 mg, 10 mg, Rectal, PRN, Ashleigh Martinez MD    alum-mag [facility]) oral suspension 30 mL, 30 mL, Oral, QID PRN, Ashleigh Martinez MD    [START ON 4/21/2022] pantoprazole (PROTONIX) tablet 40 mg, 40 mg, Oral, ACB, Daljit Carroll MD    fentaNYL (DURAGESIC) 25 mcg/hr patch 1 Patch, 1 Patch, TransDERmal, Q72H, Ashleigh Martinez MD    dexamethasone (DECADRON) 4 mg/mL injection 4 mg, 4 mg, IntraVENous, Q8H, Ashleigh Martinez MD, 4 mg at 04/20/22 1408    hyoscyamine (LEVSIN) 0.125mg/mL solution 250 mcg (Patient Supplied), 250 mcg, Oral, Q4H PRN, Ashleigh Martinez MD    metoclopramide HCl (REGLAN) injection 5 mg, 5 mg, IntraVENous, Q6H, Ashleigh Martinez MD, 5 mg at 04/20/22 1320    diphenhydrAMINE (BENADRYL) injection 50 mg, 50 mg, IntraVENous, Q4H PRN, Ashleigh Martinez MD, 50 mg at 04/20/22 1409    ondansetron (ZOFRAN) injection 4 mg, 4 mg, IntraVENous, Q4H PRN, Ashleigh Martinez MD    Orders:  Orders Placed This Encounter    DIET NPO     Standing Status:   Standing     Number of Occurrences:   1    VITAL SIGNS     Standing Status:   Standing     Number of Occurrences:   1    NURSING-MISCELLANEOUS: Comfort Care Measures CONTINUOUS     Standing Status:   Standing     Number of Occurrences:   1     Order Specific Question:   Description of Order:     Answer:   Comfort Care Measures    NURSING ASSESSMENT:  SPECIFY Assess for GIP, routine, or respite level of care. Q SHIFT Routine     Standing Status:   Standing     Number of Occurrences:   1     Order Specific Question:   Please describe the test or procedure you would like to order. Answer:   Assess for GIP, routine, or respite level of care.  BLADDER CHECKS     May scan bladder PRN for urinary retention and or patient discomfort     Standing Status:   Standing     Number of Occurrences:   92997    PAIN ASSESSMENT Pain and Symptoms: Assess ever 4 hours and PRN, for GIP level of care.  PRN Routine     Standing Status:   Standing     Number of Occurrences:   06209     Order Specific Question:   Please describe the test or procedure you would like to order. Answer:   Pain and Symptoms: Assess ever 4 hours and PRN, for GIP level of care.  DRESSING CHANGE - PICC, MLC, SUBCUTANEOUS AND ACCESSED PORT DRESSING CHANGE     PICC, MLC, SUBCUTANEOUS AND ACCESSED PORT DRESSING CHANGE:  Change catheter site dressing every 7 days and prn if site dressing becomes damp loosened or visibly soiled. Standing Status:   Standing     Number of Occurrences:   [id number]    BEDREST W/ BEDSIDE COMMODE     Standing Status:   Standing     Number of Occurrences:   1    NURSING-MISCELLANEOUS: please leave 25 mcg /hr pattch in place and document time of placement as 9:00 AM today CONTINUOUS     Standing Status:   Standing     Number of Occurrences:   1     Order Specific Question:   Description of Order:     Answer:   please leave 25 mcg /hr pattch in place and document time of placement as 9:00 AM today    NURSING-MISCELLANEOUS: patient may have 1 teaspoon ice chips every 30 min as neeeded for dry mouth or thirst CONTINUOUS     Standing Status:   Standing     Number of Occurrences:   1     Order Specific Question:   Description of Order:     Answer:   patient may have 1 teaspoon ice chips every 30 min as neeeded for dry mouth or thirst    DO NOT RESUSCITATE     Standing Status:   Standing     Number of Occurrences:   1     Order Specific Question:   Comfort Measures Only? Answer: Yes    Movantik 25 mg tab tablet     Sig: Take 1 Tablet by mouth two (2) times daily as needed.  ondansetron hcl (ZOFRAN) 8 mg tablet     Sig: Take 8 mg by mouth three (3) times daily as needed.     HYDROmorphone (DILAUDID) injection 1 mg    haloperidol lactate (HALDOL) injection 4 mg    heparin (porcine) pf 500 Units    sodium chloride (NS) flush 10 mL    heparin (porcine) pf 300 Units    sodium chloride (NS) flush 10 mL    heparin (porcine) pf 300 Units    OR Linked Order Group  HYDROmorphone (DILAUDID) injection 1 mg     HYDROmorphone (DILAUDID) injection 1 mg    OR Linked Order Group     haloperidol lactate (HALDOL) injection 4 mg     haloperidol lactate (HALDOL) injection 4 mg    bisacodyL (DULCOLAX) suppository 10 mg    alum-mag hydroxide-simeth (MYLANTA) oral suspension 30 mL    pantoprazole (PROTONIX) tablet 40 mg     Order Specific Question:   PPI INDICATION     Answer:   Symptomatic GERD    fentaNYL (DURAGESIC) 25 mcg/hr patch 1 Patch    dexamethasone (DECADRON) 4 mg/mL injection 4 mg    hyoscyamine (LEVSIN) 0.125mg/mL solution 250 mcg (Patient Supplied)    metoclopramide HCl (REGLAN) injection 5 mg    diphenhydrAMINE (BENADRYL) injection 50 mg    ondansetron (ZOFRAN) injection 4 mg    INITIAL PHYSICIAN ORDER: HOSPICE Level Of Care: General Inpatient; Reason for Admission: refractory vomiting     Standing Status:   Standing     Number of Occurrences:   1     Order Specific Question:   Status     Answer:   Hospice     Order Specific Question:   Level Of Care     Answer:   General Inpatient     Order Specific Question:   Reason for Admission     Answer:   refractory vomiting     Order Specific Question:   Inpatient Hospitalization Certified Necessary for the Following Reasons     Answer:   3. Patient receiving treatment that can only be provided in an inpatient setting (further clarification in H&P documentation)     Order Specific Question:   Admitting Diagnosis     Answer:   Refractory nausea and vomiting [5476861]     Order Specific Question:   Terminal Prognosis Diagnosis(es)     Answer:   Metastatic adenocarcinoma involving skeletal bone with unknown primary site [facility]) [5050240]     Order Specific Question:   Terminal Prognosis Diagnosis(es)     Answer:   Pancreatic cancer metastasized to liver [facility]) [1914301]     Order Specific Question:   Terminal Prognosis Diagnosis(es)     Answer: Malignant ascites [789.51. ICD-9-CM]     Order Specific Question: Admitting Physician     Answer:   Joey Martins     Order Specific Question:   Attending Physician     Answer:   Joey Martins     Order Specific Question:   Discharge Plan:     Answer: Other (Specify)    INITIAL PHYSICIAN ORDER: HOSPICE Level Of Care: General Inpatient; Reason for Admission: manage refractory nausea and vomitting     Standing Status:   Standing     Number of Occurrences:   1     Order Specific Question:   Status     Answer:   Hospice     Order Specific Question:   Level Of Care     Answer:   General Inpatient     Order Specific Question:   Reason for Admission     Answer:   manage refractory nausea and vomitting     Order Specific Question:   Inpatient Hospitalization Certified Necessary for the Following Reasons     Answer:   3. Patient receiving treatment that can only be provided in an inpatient setting (further clarification in H&P documentation)     Order Specific Question:   Admitting Diagnosis     Answer:   Cancer, pancreas, body [facility] [[id number]]     Order Specific Question:   Terminal Prognosis Diagnosis(es)     Answer:   Metastasis to bone [facility] [[id number]]     Order Specific Question:   Terminal Prognosis Diagnosis(es)     Answer:   History of known metastasis to liver [1219789]     Order Specific Question:   Terminal Prognosis Diagnosis(es)     Answer: Malignant ascites [789.51. ICD-9-CM]     Order Specific Question:   Terminal Prognosis Diagnosis(es)     Answer:   Peritoneal carcinomatosis [facility]) [9714509]     Order Specific Question:   Terminal Prognosis Diagnosis(es)     Answer:   Back pain [958012]     Order Specific Question:   Terminal Prognosis Diagnosis(es)     Answer:   Cancer-related breakthrough pain [0584917]     Order Specific Question:   Terminal Prognosis Diagnosis(es)     Answer:   Drug-induced ileus [facility] [[id number]]     Order Specific Question:   Terminal Prognosis Diagnosis(es)     Answer:   Paroxysmal atrial fibrillation with rapid ventricular response [facility]) [3485722]     Order Specific Question:   Terminal Prognosis Diagnosis(es)     Answer:   Peripheral neuropathy due to and not concurrent with chemotherapy [facility]) [[id number]]     Order Specific Question:   Admitting Physician     Answer:   Richelle Plain     Order Specific Question:   Attending Physician     Answer:   Richelle Plain     Order Specific Question:   Discharge Plan:     Answer:   Home with Home Hospice       Allergies:  No Known Allergies    Care Plan:  04/15/2022 Metastatic Adenocarcinoma of Pancreas Problems (Active)     Problem: Anticipatory Grief     Dates: Start: 04/18/22       Description: Patient/family grief (anticipatory, past, present)    Disciplines: Interdisciplinary    Goal: Grief heard and acknowledged, anxiety reduced, patient coping identified, patient/family expressed gratitude     Dates: Start: 04/18/22       Description: GOAL:  Pt Gaurav Bautista and wife Shobha Galloway will express feelings of grief, gratitude, anger, hope, etc. rooted in their [address] and their love for each other, during benefit period. 4/18/22  Progress AEB pt Gaurav Bautista saying he is not happy to be terminal but is reconciled to it and intending to have a positive attitude. Disciplines: Interdisciplinary    Intervention: Support grieving process     Dates: Start: 04/18/22       Description: Address feelings of loss, anticipatory grief, expression of concerns. Pt/family will demonstrate ability to cope with loss and grief       Intervention: Assess grief responses     Dates: Start: 04/18/22       Description: Assess pt/family for feelings of grief             Problem: Community Resource Needs     Dates: Start: 04/18/22       Description: Deficit related to resource support.     Disciplines: Interdisciplinary    Goal: Patient is receiving increased resource support to enhance ability to remain at home     Dates: Start: 04/18/22       Description: Patient/spouse is receiving increased resource support to enhance ability to remain at home AEB pt/spouse verbalize understanding of available community resources within certification benefit ending 7/13/22. Disciplines: Interdisciplinary    Intervention: Provide assistance with identifying appropriate community resources     Dates: Start: 04/18/22       Description: Provide assistance with identifying appropriate community resources             Problem: Emotional Support Needs     Dates: Start: 04/18/22       Description: Deficit related to emotional support. Disciplines: Interdisciplinary    Goal: Patient/family is receiving emotional support     Dates: Start: 04/18/22       Description: Patient/spouse is receiving emotional support AEB pt/spouse to demonstreate improved coping and verbalize emotional needs throughout certifiaction ending 7/13/22. Disciplines: Interdisciplinary    Intervention: Provide emotional support     Dates: Start: 04/18/22       Description: Provide emotional support       Intervention: Provide emotional support of the family's cultural expressions of grief and loss     Dates: Start: 04/18/22       Description: Provide emotional support of the family's cultural expressions of grief, loss, and response to illness.        Intervention: Assess for emotional distress     Dates: Start: 04/18/22       Description: Assess for emotional distress             Problem: Hospice Orientation     Dates: Start: 04/15/22       Description: Patient and PCG require hospice orientation r/t patient admission to [facility] for metastatic adenocarcinoma of pancreas    Disciplines: Interdisciplinary    Goal: Demonstrate understanding of hospice philosophy, plan of care, and home hospice program     Dates: Start: 04/15/22   End: 05/15/22    Description: PCG Sandi Ortega and Kelly Menjivar will demonstrate understanding of the hospice philosophy, plan of care and the home hospice program as evidenced by participation in meeting Clayton's psychosocial, spiritual, medical, and physical needs inclusive of medical supplies/equipment focusing on symptoms within one month. Disciplines: Interdisciplinary    Intervention: Instruct: hospice orientation     Dates: Start: 04/15/22   End: 05/15/22    Description: Instruct patient/caregiver on hospice orientation             Problem: Infection Prevention     Dates: Start: 04/15/22       Description: PCG requires continued education on prevention of infection r/t immunocompromised patient    Disciplines: Interdisciplinary    Goal: *Prevention of infection     Dates: Start: 04/15/22   End: 05/15/22    Description: AUTUMN Olmstead) will verbalize understanding and demonstrate ways to prevent infection within 30 days. Disciplines: Interdisciplinary    Intervention: Instruct infection prevention     Dates: Start: 04/15/22   End: 05/15/22    Description: Instruct PCG on the importance of infection prevention measures, such as proper hand hygiene technique, use of appropriate PPE, cleaning environment and disinfecting surfaces. Problem: Knowledge deficit related to High Risk Medications     Dates: Start: 04/15/22       Description: PCG requires continued education on use of insulin r/t patient pescribed insulin secondary to diabetes mellitus type 2    Disciplines: Interdisciplinary    Goal: Patient/caregiver will demonstrate knowledge of insulin medication(s)     Dates: Start: 04/15/22   End: 04/29/22    Description: AUTUNM [place]) will demonstrate knowledge of insulin medication as evidenced by teaching back purpose and scheduling of medication and stating at least one risk/reportable side effect and how to mitigate it within 2 week(s).     Disciplines: Interdisciplinary    Intervention: Instruct on insulin medication(s)     Dates: Start: 04/15/22   End: 04/29/22    Description: Instruct caregiver on insulin medication(s) purpose, dose, appearance, preparation, storage, and scheduling, as well as s/sx of hypoglycemia (shakiness, dizziness, sweating, confusion, and irritability) and hyperglycemia (nausea/vomiting, shortness of breath, fruity-smelling breath, weakness, and dry mouth). Problem: Management of Home Medications     Dates: Start: 04/15/22       Description: PCG requires continued education on management of home medications    Disciplines: Interdisciplinary    Goal: Knowledge of medication management     Dates: Start: 04/15/22   End: 04/29/22    Description: AUTUMN Nazario) will follow prescribed medication therapy and schedule, and verbalize understanding of purpose and side effects of medication within 2 weeks. Disciplines: Interdisciplinary    Intervention: Instruct on management of home medications     Dates: Start: 04/15/22   End: 04/29/22    Description: Instruct caregiver in medication administration, purpose, dosages, preparation, scheduling, side effects, food/drug interactions, storage, and potential complications. Goal: *Complies with medication therapy     Dates: Start: 04/15/22   End: 07/14/22    Description: AUTUMN Nazario) will receive and comply with hospice prescribed medications AEB symptoms are being managed during this benefit period. Disciplines: Interdisciplinary    Intervention: Medication reconciliation     Dates: Start: 04/15/22   End: 07/14/22    Description: Perform medication reconciliation every visit and update patient's med list copy for new,  changed, discontinued medications as needed . Assess for effectiveness and compliance with medications every visit.                   Problem: Pain     Dates: Start: 04/15/22       Description: Patient experiencing chronic pain r/t metastatic cancer    Disciplines: Interdisciplinary    Goal: Assess satisfaction of level of comfort and symptom control     Dates: Start: 04/15/22   End: 07/14/22    Description: Marla Short and/or AUTUMN Nazario) will verbalize satisfaction with Clayton's level of comfort and symptom control AEB verbalization of chronic pain to an acceptable level of 3/10 or less throughout current hospice benefit period. Disciplines: Interdisciplinary    Intervention: Assess effectiveness of pain management     Dates: Start: 04/15/22   End: 07/14/22    Description: Assess effectiveness of pain management for patient each visit             Problem: Portacath     Dates: Start: 04/16/22       Description: Patient has a Portacath to R Upper Chest r/t hx of chemotherapy    Disciplines: Interdisciplinary    Goal: Knowledge of catheter care     Dates: Start: 04/16/22   End: 07/15/22    Description: Karena Norris will have no complications to VAD site throughout this benefit period. Disciplines: Interdisciplinary    Intervention: CATHETER CARE     Dates: Start: 04/16/22   End: 07/15/22    Description: PORTACATH R Upper Chest:  Hospice RN to Flush with 10cc NS followed by 300 units of Heparin intracatheter once monthly. Problem: Risk for Falls     Dates: Start: 04/15/22       Description: Patient is at risk for falls r/t MAHC-10 score of 7/10    Disciplines: Interdisciplinary    Goal: Knowledge of fall prevention     Dates: Start: 04/15/22   End: 05/15/22    Description: AUTUMN Mae and Karena Puras will demonstrate use of safety precautions to prevent falls and improve overall patient safety within one month. Disciplines: Interdisciplinary    Intervention: Instruct on fall prevention     Dates: Start: 04/15/22   End: 05/15/22    Description: Assess recent falls every visit. Instruct patient/caregiver in fall prevention strategies: use of assistive device, adequate lighting, and monitor medication that may alter mental status. Document and report falls.                 Problem: Spiritual Evaluation     Dates: Start: 04/18/22       Description: Identify patient/family spiritual or Spiritism resources    Disciplines: Interdisciplinary    Goal: Identify beliefs/practices that support hospice experience (Resolved)     Dates: Start: 04/18/22   End: 04/18/22    Description: GOAL:  Patient/family identify their beliefs/practices that impair/support Hospice experience. 4/18/22 Completed AEB pt Clayton's and wife Em's attesting to their [employer]. Disciplines: Interdisciplinary         Encounter Problems (Active)     All active problems have been filtered. Care Plan Problems/Goals  Report    0 of 0 Goals Met 0 of 0 Met          ___________________    Care Team Notes          POC/IDG Notes      [facility] IDG  Notes by Edilia Hammonds at 04/20/22 1456  Version 1 of 1    Author: Edilia Hammonds Service: Spiritual Care Author Type: Pastoral Care    Filed: 04/20/22 1456 Date of Service: 04/20/22 1456 Status: Signed    : Edilia Hammonds (Pastoral Care)       Patient: Clay Jaffe Starr    Date: 04/20/22  Time: 2:56 PM    [facility]  Notes    / Grief Counselor has reviewed  Initial Comprehensive Assessment and plan of care. Bereavement and Spiritual Care Assessments to be completed and plan of care put in place to meet patient and family needs. Signed by: Elsa ABBOTT [facility] IDG Nurse Notes by Luda Ware RN at 04/20/22 1246  Version 1 of 1    Author: Luda Ware RN Service: -- Author Type: Registered Nurse    Filed: 04/20/22 1302 Date of Service: 04/20/22 1246 Status: Signed    : Luda Ware RN (Registered Nurse)       Patient: Clay Jaffe Starr    Date: 04/20/22  Time: 12:46 PM    [facility] Nurse Notes  Pt arrives via [address] [place] [address] for Pancreatic Cancer, symptom management is for pain, nausea, and vomiting. Pt is alert and oriented x 4 c/o pain and nausea. He states that he does not have any energy and feels fatigued. Pt states that he has uncontrollable vomiting and a dry mouth. Last oral intake was a small amount of pudding on 4/19/2022 and no liquids. Pt to have 1 tsp of ice chips every 30 minutes other than that NPO.   Pts breathing is regular non-labored, port on right side of chest. Port has not been used in >30d. Accessed, flushed with saline/heparin and patent. Abdomen distended with ascites, increased pain on palpation. Pt states his last bowel movement was 7 days prior. Bowel sounds hypoactive. Pts able to control bowel and bladder and is up to bedside commode. Pt wears a brief at times but does not have one on currently. Spine and buttocks redness noted. No breaks in skin, and blanchable. Pt is on a regular mattress. Upper extremities no edema noted, lower extremities +1 pitting edema in the feet only. Pt moves all extremities x4 with intent. Pt has a fentanyl 25mcg patch on right upper arm, placed by Piter Sauer Alma RN, with orders to keep this patch in place. Pt NOK, Spouse Em Clements at bedside. Pts family oriented to [facility]. Signed by: Caridad Hamman, RN       [facility] IDG  Notes by Dary Marie LMSW at 04/20/22 1245  Version 1 of 1    Author: Dary Marie LMSW Service: -- Author Type: Licensed Masters in Social Work    Filed: 04/20/22 1716 Date of Service: 04/20/22 1245 Status: Signed    : Dary Marie [address] [facility] (Licensed Masters in Social Work)       Patient: Andrae Mitchell Starr    Date: 04/20/22  Time: 12:45 PM    [facility]  Notes  SW has read the initial comprehensive assessment and plan of care. No immediate needs noted. Initial SW assessment visit will be completed within 5 days of admission.          Signed by: Romy Contreras LMSW                Care Team Present:

## 2022-04-21 NOTE — PROGRESS NOTES
3433- The patient report taken from Alfredo Retana RN and walking rounds completed. The patient is GIP with diagnosis of Pancreatic cancer. The patient has discharge plans to remain in the [facility] to get vomiting and nausea under control. He is under the care of [facility]. The patient is sitting up in a chair and his wife is at the bedside. He states that he is not in any pain, has no nausea, no SOB and anxiety at this time. 7215- The patient is ready to get back into the bed. He stands and transfers with assist of one. He continues to deny any distress. 7947- Medications administered as ordered. Patient took with protonix and was choked. He coughed for a bit and then stopped. Patient continues to deny pain, SOB, nausea and anxiety. His port dressing was loose and was reinforced. 1030- Patient is resting in the bed speaking with his family at the bedside. He continues to deny any distress. 1200- Scheduled Reglan 5 mg administered IV and line flushed. The patient has a diet changed to Clear liquids to advance to pleasure if he tolerates clear liquids well. The patient and his wife voiced understanding of the diet change. 1245- Patient is resting with eyes closed. Respirations are even and unlabored at 14 minute. No signs of distress. His wife, Katelynn Waterman, is at the bedside a gives a thumbs up. It appears that the patient tolerated clear liquids well. 1400- The patient continues to rest with no signs of distress observed. He remains alert and oriented times 3 and his wife remains at the bedside. 1523- Administered Reglan IV and flushed line. Line flushed well. Patient remains alert and oriented times 3. He voiced no complaints. 1700- The patient is awake and continues to deny pain, SOB, nausea and anxiety. He took his protonix and some of the Mylanta as it was thick and he did not want to vomit. He is sitting up eating a few bites of creamed soup.  His wife remains at the bedside. 1830- - Medicated with scheduled Reglan and flushed line. Patient remains alert times 3 and continues to deny and distress. 2047- Administered Hydromorphone 1 mg  IV for pain in the sacral area 3 and scheduled Heparin flush and normal saline flush. The on coming RN was informed. Report off to Kenny Choi RN.

## 2022-04-21 NOTE — PROGRESS NOTES
Attempted to see patient for Occupational Therapy session but unable at this time. Patient was not available for their therapy session at this time due to: patient requesting no therapy today. Patient requesting to rest as having a difficult night, patient receptive to UE HEP handout and energy conservation handout. However declines participation in HEP despite encouragement.     Will re-attempt per established treatment plan.   Problem: Emotional Support Needs  Goal: Patient/family is receiving emotional support  Description: Pt, pt's spouse June To, pt's brother Tangela Mahmood, and pt's family/friends will receive emotional support from SW weekly through weekly check-ins, education on the hospice philosophy, active listening, rapport building, and validation of feelings throughout pt's hospice journey as evidenced by their voiced understanding of LOC changes and room and board charges, lack of heightened emotional responses, and acknowledgement of SW services available.    Outcome: Progressing Towards Goal

## 2022-04-21 NOTE — PROGRESS NOTES
Progress Note    Patient: Elda Clements MRN: [id number]  SSN: [id number]    YOB: 1960  Age: 63 y.o. Sex: male      Subjective:     Lethargic. Taking in sips. Has required dilaudid x 1 for pain and benadryl x 1 for akathisia. Past Medical History:   Diagnosis Date    A-fib [facility])     Anxiety     Cancer, pancreas, body ([address] 09/01/2021    Depression     HTN (hypertension)     Hyperlipidemia     Hyperuricemia      Past Surgical History:   Procedure Laterality Date    IR INSERT TUNL CVC W PORT OVER 5 YEARS Right 09/01/2021      Family History   Problem Relation Age of Onset    Cancer Mother     Pancreatic Cancer Sister     Colon Cancer Neg Hx     Prostate Cancer Neg Hx      Social History     Tobacco Use    Smoking status: Current Some Day Smoker     Packs/day: 0.25     Types: Cigars    Smokeless tobacco: Never Used   Substance Use Topics    Alcohol use: No      Prior to Admission medications    Medication Sig Start Date End Date Taking? Authorizing Provider   Movantik 25 mg tab tablet Take 1 Tablet by mouth two (2) times daily as needed. 4/7/22  Yes Provider, Historical   ondansetron hcl (ZOFRAN) 8 mg tablet Take 8 mg by mouth three (3) times daily as needed. 10/3/21  Yes Provider, Historical   pantoprazole (PROTONIX) 40 mg tablet Take 40 mg by mouth two (2) times a day. Indications: indigestion, gastroesophageal reflux disease 4/15/22  Yes Barry Sepulveda NP   acetaminophen (TYLENOL) 500 mg tablet Take 500 mg by mouth every four (4) hours as needed (mild pain or fever). Administer one tablet (=500mg) by mouth every 4 hours as needed for mild pain or fever   Indications: mild pain and/or fever 4/15/22  Yes Barry Sepulveda NP   oxyCODONE IR (ROXICODONE) 5 mg immediate release tablet Take 5 mg by mouth every four (4) hours as needed (severe pain ).  Administer one tablet (=5mg) by mouth every 4 hours as needed for severe pain   Indications: severe pain 4/15/22  Yes Lorrie Katja Kayser, NP   sennosides-docusate sodium (Senna Plus) 8.6-50 mg cap Take 2 Tablets by mouth two (2) times a day. Administer two tablets (=17.2-100mg) by mouth two times daily. Hold for greater than 2 bowel movements in 24 hours. Indications: constipation 4/15/22  Yes Mortimer Milan, NP   dexAMETHasone (DECADRON) 4 mg tablet Take 4 mg by mouth daily. Indications: Cancer Related Pain 4/15/22  Yes Mortimer Milan, NP   insulin degludec Dahl Bring FlexTouch U-100) 100 unit/mL (3 mL) inpn 10 Units by SubCUTAneous route daily. Administer 10 units subcutaneously once daily. Hold for BS < 100 and/or NPO status. Indications: type 2 diabetes mellitus 4/15/22  Yes Mortimer Milan, NP   sodium chloride (Normal Saline Flush) 10 mL by IntraCATHeter route every month. PORTACATH R Upper Chest:  Hospice RN to Flush with 10cc NS followed by 300 units of Heparin intracatheter once monthly. Indications: maintain patency of indwelling intravenous catheter 4/15/22  Yes Mortimer Milan, NP   heparin, porcine, pf (heparin LockFlush,Porcine,,PF,) 100 unit/mL injection 300 Units by IntraCATHeter route every month. PORTACATH R Upper Chest :  Hospice RN to Flush with 10cc NS followed by 300 units of Heparin intracatheter once monthly. Indications: prevent clot from blocking an intravenous catheter 4/15/22  Yes Mortimer Milan, NP   oxyCODONE IR (ROXICODONE) 5 mg immediate release tablet Take 10 mg by mouth nightly. Administer two tablets (=10mg) by mouth every bedtime   Indications: severe pain disrupting sleep  Patient not taking: Reported on 4/20/2022 4/15/22   Mortimer Milan, NP        No Known Allergies    Review of Systems:  Denies nausea. Dyspnea at rest. C/o pain.      Objective:     Vitals:    04/20/22 1200 04/21/22 0430   BP: 136/76 92/71   Pulse: 98 (!) 115   Resp: 22 18   Temp: 98 °F (36.7 °C) 97.7 °F (36.5 °C)        Physical Exam:  GENERAL: fatigued, cooperative, severe distress, appears older than stated age, icteric, cachectic,   LUNG: Coarse breath sounds with labored respirations. Use of accessory muscles. HEART: regular rate and rhythm  ABDOMEN: soft, non-tender, distended. Ascites. Bowel sounds active. : Unable to void due to urinary retention. EXTREMITIES:  extremities are dusky. Moderate edema, left greater than right. SKIN: Pale, dusky. Jaundice. Scattered ecchymoses on upper ext. NEUROLOGIC: Drowsy. Generalized weakness. Bedbound. PSYCHIATRIC: anxious    Assessment:     Hospital Problems  Date Reviewed: 4/20/2022          Codes Class Noted POA    Malignant neoplasm of body of pancreas ([facility] ICD-10-CM: C25.1  ICD-9-CM: 157.1 End Stage 4/20/2022 Yes        * (Principal) Refractory nausea and vomiting ICD-10-CM: R11.2  ICD-9-CM: 787.01 Acute 4/20/2022 Yes        Malignant ascites ICD-10-CM: R18.0  ICD-9-CM: 789.51 End Stage 4/20/2022 Yes        Abdominal pain ICD-10-CM: R10.9  ICD-9-CM: 789.00 Acute 4/20/2022 Yes        Peritoneal carcinoma ([facility] ICD-10-CM: C48.2  ICD-9-CM: 158.9 Acute 4/20/2022 Yes        Peripheral neuropathy due to and not concurrent with chemotherapy [facility]) ICD-10-CM: G62.0, T45.1X5S  ICD-9-CM: 357.6, 909.5 Chronic 4/20/2022 Yes        Malignant cachexia ([facility] ICD-10-CM: R64  ICD-9-CM: 799.4 Chronic 4/20/2022 Yes        Physical debility ICD-10-CM: R53.81  ICD-9-CM: 799.3 Acute 4/20/2022 Yes        Cancer, pancreas, body ([facility] ICD-10-CM: C25.1  ICD-9-CM: 157.1  4/20/2022 Unknown        Palpitations ICD-10-CM: R00.2  ICD-9-CM: 785.1  5/24/2017 Yes    Overview Signed 6/29/2017  1:29 PM by Mikey Sotelo MD     Check TSH.                    Plan:     Current Facility-Administered Medications   Medication Dose Route Frequency    alum-mag hydroxide-simeth (MYLANTA) oral suspension 30 mL  30 mL Oral QID PRN    pantoprazole (PROTONIX) tablet 40 mg  40 mg Oral ACB&D    sodium chloride (NS) flush 10 mL  10 mL InterCATHeter Q12H    heparin (porcine) pf 300 Units  300 Units InterCATHeter Q12H    sodium chloride (NS) flush 10 mL  10 mL InterCATHeter PRN    heparin (porcine) pf 300 Units  300 Units InterCATHeter PRN    HYDROmorphone (DILAUDID) injection 1 mg  1 mg IntraVENous Q30MIN PRN    Or    HYDROmorphone (DILAUDID) injection 1 mg  1 mg SubCUTAneous Q30MIN PRN    haloperidol lactate (HALDOL) injection 4 mg  4 mg IntraVENous Q1H PRN    Or    haloperidol lactate (HALDOL) injection 4 mg  4 mg SubCUTAneous Q1H PRN    bisacodyL (DULCOLAX) suppository 10 mg  10 mg Rectal PRN    alum-mag hydroxide-simeth (MYLANTA) oral suspension 30 mL  30 mL Oral QID PRN    fentaNYL (DURAGESIC) 25 mcg/hr patch 1 Patch  1 Patch TransDERmal Q72H    dexamethasone (DECADRON) 4 mg/mL injection 4 mg  4 mg IntraVENous Q8H    hyoscyamine (LEVSIN) 0.125mg/mL solution 250 mcg (Patient Supplied)  250 mcg Oral Q4H PRN    metoclopramide HCl (REGLAN) injection 5 mg  5 mg IntraVENous Q6H    diphenhydrAMINE (BENADRYL) injection 50 mg  50 mg IntraVENous Q4H PRN    ondansetron (ZOFRAN) injection 4 mg  4 mg IntraVENous Q4H PRN       Admitted GIP with metastatic pancreatic cancer for management of pain, dyspnea and anxiety/agitation. Insert ordonez catheter due to urinary retention caused by antihistamines. No BM in at least at week. Check for impaction and administer dulcolax for constipation.      Signed By: Jourdan Seymour NP     April 22, 2022

## 2022-04-21 NOTE — PROGRESS NOTES
Problem: Anticipatory Grief     GOAL: Florin Lyles and Tamara Reza ( brother)   will demonstrate appropriate anticipatory grief reactions related to Clayton's  impending death as evidenced by  their ability to verbalize feelings of denial, bargaining, anger, and depression, display feelings and associated behaviors in an acceptable manner during inpatient hospice stay.      Outcome: Progressing Towards Goal

## 2022-04-21 NOTE — PROGRESS NOTES
Goal:  Mike Files and family will verbalize experiencing peace and calmness about death and the afterlife based on their beliefs/ cain tradition. Calmness evidenced by normal respirations and lack of reported anxiety and an ability to communicate hope [facility].   Outcome: Progressing Towards Goal

## 2022-04-21 NOTE — PROGRESS NOTES
Demographics      Information provided by: Pt's spouse [facility]. SW spent some time talking with pt as well before stepping out of the room with [facility] for further discussion. Name of patient: Columba Bravo                                                                    Level of Care:  GIP [x] Routine  [] Respite   []           From the in home program: Yes [x] No []                                                        Diagnosis: Refractory nausea and vomiting       Insurance: Medicare []   Medicaid  []  [employer]  []    Other [x]          **Pt has [employer]      Social:    [x]   Single []     []    []  Significant other []  Partner []   **Pt and [facility] have been  for 20 years and together in total for 30. Children: No children        [employer] Used in the Home prior to admission: Yes [x]  No []    [employer] Needed? Yes []  No [x]    If yes, explain: Pt and [facility] had in home hospice before coming into [facility]. Financial Concerns: [facility] reports no financial concerns. SW discussed LOC with [facility] and room and board charges of $256 a day if pt's LOC changes. [facility] voiced understanding. She reports that if pt cannot go home for some reason but changes to routine LOC, paying room and board would not be a problem. Krystal Application Needed   Yes []  No [x]     Medicaid application needed Yes []  No [x]                                    IFA Form Complete  Yes [x]   No []     Discharge Plans: Plans are to remain [facility] for GIP and comfort care. Work History :  Retired [] [employer] [] Part-time [] Disabled [] Not working [x]   **Pt has worked for [employer] for 30+ years. [facility] also works for [employer].      [address]   Yes []  No [x]  Branch   [employer] []   [employer] [] [employer] [] [employer] []  Affiliated [employer] []  [employer] []  Linked to [place]   Yes []  No []  Referral made to [facility] Yes [] No []         Advanced Directives Scanned in the system      Living Will  Yes  []  No [x]   HCPOA     Yes  []  No [x]   DPOA        Yes  []  No [x]  DNR           Yes [x]  No []         Spiritual / [place]: Erin Ochoa reports that they are Protestant but no main Taoist affiliation. She reports that they both have been saved and pt is at peace with impending death. Final Arrangements: Erin Ochao will be using [facility] in [facility] for  home once pt passes. She reports that her best friends father, who arrived at [facility] during this assessment and SW met, Kevin Bo will be preaching pt's  and pt has already discussed this with him. Mental Health History: No reports of mental health history for pt. Volunteer discussion: Yes []    No [x]  **Due to COVID-19 protocols. Goals of care for the patient and family: To keep pt comfortable and to meet pt and family needs. Coping and Bereavement: Erin Ochoa was tearful while being accepting. Erin Ochoa reports that pt is accepting as well and he was joking with SW and Erin Ochoa. Erin Ochoa reports that he has maintained his humor even through his illness. SW will continue to assess for coping and bereavement needs. Was a Referral made to Bereavement  Yes [] No  [x]      Support Needs: SW and Erin Ochoa discussed LOC and how to discuss this with pt, if he cannot return home or if he can. Edita Alvarado know that SW will update her when/if LOC changes and we will make a plan of how to discuss this with pt. Erin Gabriela wants to make sure that pt's brother Isa Fernandez is involved in the discussion as well. SW assured her that SW will keep them updated. Erin Ochoa reports that she has supportive family 2-3 hours away and that she and pt have a great group of very supportive friends that live in town. [employer] jennifer is also very family oriented and supportive of what pt and Erin Guevarach need during this time.  SW will continue to assess for support needs throughout pt's hospice journey.        Other Discussions: N/A

## 2022-04-21 NOTE — PROGRESS NOTES
1830:  Patient report from David Liz RN and Nico Baird RN. Patient ID by name and . Patient admitted for [facility] LOC for hospice diagnosis of pancreatic cancer to manage pain, nausea, and vomiting. Discharge plan is for patient to remain in [facility] until demise. Discharge plans to be evaluated for potential LOC change. RN reviewed POC with CNA. Patient in bed with eyes closed. No distress observed. Wife at bedside and all safety measures in place. Call bell in reach. :  Scheduled Decadron IV given and port flushed as ordered. Patient pulled up in bed. Denies any pain or nausea/vomiting at this time. Wife at bedside. Assessment complete. Call bell in reach. 2300:  Patient resting with symptoms managed at this time. 0004:  Scheduled Reglan IV administered as ordered. Patient denies pain or any nausea/vomiting at this time. 0200:  Patient resting with symptoms managed at this time. Wife remains at bedside. 0330:  Patient awake; states his bottom is uncomfortable. Suggested patient lay flatter in the bed so as not to put so much pressure on his sacrum and offered pillow support under bottom as well. Patient agreeable. Offered pain medicine, but patient refusing at this time. States will try these changes first and if desires pain medication will notify nurse. Wife remains at bedside. 0530:  Patient resting with symptoms managed at this time. Wife remains at bedside. 0604:  Scheduled Decadron and Reglan given IV as ordered. Patient sitting on side of bed having a few ice chips. Patient has not required any PRN medications this shift. Report to Sydney Salcedo RN.

## 2022-04-21 NOTE — PROGRESS NOTES
SW reached out to the in home team to find out if an IFA was completed and signed for pt as he came from the in home program to [facility] as GIP. No IFA is in the chart at this time. VIJAY will discuss LOC and room and board charges with pt's spouse Prashanth Rudd during assessment.

## 2022-04-21 NOTE — PROGRESS NOTES
Problem: Hospice Orientation  Goal: Demonstrate understanding of hospice philosophy, plan of care, and home hospice program  Description: The patient/family/caregiver will demonstrate understanding of hospice philosophy, plan of care and the home hospice program as evidenced by participation in meeting the patient's psychosocial, spiritual, medical, and physical needs inclusive of medical supplies/equipment focusing on symptoms. Outcome: Resolved/Met     Problem: Risk for Falls  Goal: Free of falls during inpatient stay  Description: Patient Nicholas Clements will remain free from falls AEB no injuries r/t falls each shift during inpatient hospice stay. Outcome: Progressing Towards Goal     Problem: Pain  Goal: Assess satisfaction of level of comfort and symptom control  Description: Patient Nicholas Clements will exhibit decrease in pain AEB rating pain less than 3 on 1-10 scale or 3 FLACC score within each shift of receiving pain medication during inpatient hospice stay. Outcome: Progressing Towards Goal     Problem: Nausea/Vomiting (Adult)  Goal: *Absence of nausea/vomiting  Description: Patient Nicholas Clements will exhibit decreased or eliminated episodes of Nausea/Vomiting AEB less than 2 PRN antiemetic medication administration each shift during inpatient hospice stay. Outcome: Progressing Towards Goal     Problem: Infection - Risk of, Central Venous Catheter-Associated Bloodstream Infection  Goal: *Absence of infection signs and symptoms  Description: Patient Nicholas Clements will remain free from infection R/T Central Venous Line AEB absence of signs and symptoms of infection each shift during inpatient hospice stay. Outcome: Progressing Towards Goal     Problem: Discharge Planning  Goal: *Participates in discharge planning  Description: Patient Nicholas Clements Pt, and pt's family/ care giver will receive support from community resources through the support of SW such as SNF placements, SW calling facilities for the family, updating the family/care giver on facilities, and sending out referrals for pt and family/ care giver. Outcome: Progressing Towards Goal     Problem: Falls - Risk of  Goal: *Absence of Falls  Description: Document Abril Granados Fall Risk and appropriate interventions in the flowsheet. Outcome: Progressing Towards Goal  Note: Fall Risk Interventions:  Mobility Interventions: Bed/chair exit alarm         Medication Interventions: Patient to call before getting OOB,Teach patient to arise slowly    Elimination Interventions: Bed/chair exit alarm,Call light in reach              Problem: Falls - Risk of  Goal: *Absence of Falls  Description: Document Abril Granados Fall Risk and appropriate interventions in the flowsheet.   Outcome: Progressing Towards Goal  Note: Fall Risk Interventions:  Mobility Interventions: Bed/chair exit alarm         Medication Interventions: Patient to call before getting OOB,Teach patient to arise slowly    Elimination Interventions: Bed/chair exit alarm,Call light in reach

## 2022-04-21 NOTE — PROGRESS NOTES
Background: Chani Tian. Eagle Naidu is a 63year old gentleman who comes to us with a  Diagnosis of pancreatic cancer. He was admitted to the In Home Program Initially. He is GIP level of care for symptom management. His symptoms are pain, nausea and vomiting. Twin Rodriguez is employed at [employer] in [place] His wife Tomas Lubin works for [employer] remotely. Twin Rodriguez is supported by his wife Tomas Lubin, his brother Sveta Davis and multiple friends. Twin Rodriguez is a Oriental orthodox. His Blessing Tradition is [employer].      Assessment: During 's visit Twin Rodriguez was sleeping. His wife Tomas Lubin was present. She said Twin Rodriguez had been awake earlier. He managed to eat a little and keep it down. She described Twin Rodriguez as one who loves to joke and \" cut up\". He enjoys tinkering with cars and especially restoring old cars. She spoke of how they were good friends before they were a couple. Tomas Lubin was tearful at times. She is trying to take care of herself so she can take care of him.  affirmed the need to rest and restore. Tomas Lubin spoke very highly of the care Twin Rodriguez is receiving. She is pleased with the facility and said everyone has been very caring. While she is grateful to be at [facility], their goal is to get Twin Rodriguez better so he can go home. That is his wish.  encouraged Tmoas Lubin to take one day at a time. During this encounter  provided support through active listening, positive affirmation, prayer and compassion. Plan: Continue to provide spiritual and emotional support with a focus on Anticipatory Grief.

## 2022-04-22 NOTE — [facility] IDG CHAPLAIN NOTES
Patient: Nicolasa Archuleta Starr    Date: 04/22/22  Time: 10:27 AM    [facility]  Notes  Intervention: Ministry of presence. Spiritual and Bereavement Assessments completed. Support provided for wife through active listening, affirmation of Blessing, compassion and prayer. Outcome: Mike Choudhury was able to share their story. She was tearful at times but appropriately expressing her anticipatory grief. Plan: Continue to provide spiritual and emotional support. Focus on Anticipatory Grief.          Signed by: Nacho Madden

## 2022-04-22 NOTE — [facility] IDG MASTER NOTE
[facility]  Date: 04/22/22  Time: 11:43 AM    ___________________    Patient: Nico Clements  Coverage Information:     Payor: JOAN     Plan: BSHSI JOAN MARINELLIO     Subscriber ID: [id number]     Phone Number:   MRN: [id number]    Current Benefit Period: Benefit Period 1  Start Date: 4/15/2022  End Date: 7/13/2022        Hospice Attending Provider: Monika Homans, [address] [place] [address]  [id number]  Phone: [phone]  Fax: [phone]    Level of Care: General Inpatient Care      ___________________    Diagnoses: The primary encounter diagnosis was Refractory nausea and vomiting. Diagnoses of Malignant ascites, Pain of upper abdomen, Peritoneal carcinoma ([facility] [address] Metastatic adenocarcinoma to pancreas [facility]), Malignant cachexia ([facility] [address] Peripheral neuropathy due to and not concurrent with chemotherapy [facility]), Palpitations, Hepatic metastases ([facility] [address] Physical debility, and Metastasis to bone [facility]) were also pertinent to this visit.     Current Medications:    Current Facility-Administered Medications:     senna (SENOKOT) tablet 17.2 mg, 2 Tablet, Oral, BID, Ludwin Mckeon NP, 17.2 mg at 04/22/22 1048    bisacodyL (DULCOLAX) suppository 10 mg, 10 mg, Rectal, ONCE, Brie Andrew NP    metoclopramide HCl (REGLAN) tablet 5 mg, 5 mg, Oral, AC&HS, Ludwin Mkceon NP, 5 mg at 04/22/22 1048    alum-mag hydroxide-simeth (MYLANTA) oral suspension 30 mL, 30 mL, Oral, QID PRN, Ludwin Mckeon NP, 30 mL at 04/21/22 1659    pantoprazole (PROTONIX) tablet 40 mg, 40 mg, Oral, ACB&D, Ludwin Mckeon NP, 40 mg at 04/22/22 0732    sodium chloride (NS) flush 10 mL, 10 mL, InterCATHeter, Q12H, Monika Homans, MD, 10 mL at 04/21/22 2050    heparin (porcine) pf 300 Units, 300 Units, InterCATHeter, Q12H, Monika Homans, MD, 300 Units at 04/21/22 2049    sodium chloride (NS) flush 10 mL, 10 mL, InterCATHeter, PRN, Monika Homans, MD, 10 mL at 04/22/22 0612    heparin (porcine) pf 300 Units, 300 Units, InterCATHeter, PRN, Effie Rodas MD    HYDROmorphone (DILAUDID) injection 1 mg, 1 mg, IntraVENous, Q30MIN PRN, 1 mg at 04/21/22 2047 **OR** HYDROmorphone (DILAUDID) injection 1 mg, 1 mg, SubCUTAneous, Q30MIN PRN, Effie Rodas MD    haloperidol lactate (HALDOL) injection 4 mg, 4 mg, IntraVENous, Q1H PRN **OR** haloperidol lactate (HALDOL) injection 4 mg, 4 mg, SubCUTAneous, Q1H PRN, Effie Rodas MD    bisacodyL (DULCOLAX) suppository 10 mg, 10 mg, Rectal, PRN, Effie Roads MD    alum-mag hydroxide-simeth (MYLANTA) oral suspension 30 mL, 30 mL, Oral, QID PRN, Effie Rodas MD    fentaNYL (DURAGESIC) 25 mcg/hr patch 1 Patch, 1 Patch, TransDERmal, Q72H, Effie Rodas MD    dexamethasone (DECADRON) 4 mg/mL injection 4 mg, 4 mg, IntraVENous, Q8H, Effie Rodas MD, 4 mg at 04/22/22 4431    hyoscyamine (LEVSIN) 0.125mg/mL solution 250 mcg (Patient Supplied), 250 mcg, Oral, Q4H PRN, Effie Rodas MD    ondansetron [facility]) injection 4 mg, 4 mg, IntraVENous, Q4H PRN, Effie Rodas MD, 4 mg at 04/20/22 1826    Orders:  Orders Placed This Encounter    DIET PLEASURE     Standing Status:   Standing     Number of Occurrences:   1    VITAL SIGNS     Standing Status:   Standing     Number of Occurrences:   1    NURSING-MISCELLANEOUS: Comfort Care Measures CONTINUOUS     Standing Status:   Standing     Number of Occurrences:   1     Order Specific Question:   Description of Order:     Answer:   Comfort Care Measures    BLADDER CHECKS     May scan bladder PRN for urinary retention and or patient discomfort     Standing Status:   Standing     Number of Occurrences:   [id number]    PAIN ASSESSMENT Pain and Symptoms: Assess ever 4 hours and PRN, for GIP level of care. PRN Routine     Standing Status:   Standing     Number of Occurrences:   [id number]     Order Specific Question:   Please describe the test or procedure you would like to order.      Answer:   Pain and Symptoms: Assess ever 4 hours and PRN, for GIP level of care.  DRESSING CHANGE - PICC, MLC, SUBCUTANEOUS AND ACCESSED PORT DRESSING CHANGE     PICC, MLC, SUBCUTANEOUS AND ACCESSED PORT DRESSING CHANGE:  Change catheter site dressing every 7 days and prn if site dressing becomes damp loosened or visibly soiled. Standing Status:   Standing     Number of Occurrences:   [id number]    BEDREST W/ BEDSIDE COMMODE     Standing Status:   Standing     Number of Occurrences:   1    NURSING-MISCELLANEOUS: please leave 25 mcg /hr pattch in place and document time of placement as 9:00 AM today CONTINUOUS     Standing Status:   Standing     Number of Occurrences:   1     Order Specific Question:   Description of Order:     Answer:   please leave 25 mcg /hr pattch in place and document time of placement as 9:00 AM today    NURSING-MISCELLANEOUS: patient may have 1 teaspoon ice chips every 30 min as neeeded for dry mouth or thirst CONTINUOUS     Standing Status:   Standing     Number of Occurrences:   1     Order Specific Question:   Description of Order:     Answer:   patient may have 1 teaspoon ice chips every 30 min as neeeded for dry mouth or thirst    NURSING ASSESSMENT:  SPECIFY Assess for GIP, routine, or respite level of care. a 73-year-old man with a principal hospice diagnosis of metastatic adenocarcinoma of the pancreas confirmed September 2021 who was referred to hospice care by his onco...     a 73-year-old man with a principal hospice diagnosis of metastatic adenocarcinoma of the pancreas confirmed September 2021 who was referred to hospice care by his oncologist Gabe Bourne MD at [address] [place] [address] after patient demonstrated persistent decline in functional status and upward trend in CA 19-9 antigen to 29,700.  Peritoneal carcinomatosis and malignant ascites is a particularly potent related diagnosis adversely affecting his prognosis.  Mr. Clements underwent right lower quadrant US directed therapeutic 5 L paracentesis 0 4/7/2022 to relieve distention and nausea.  He received a week of oral ciprofloxacin for prophylaxis against bacterial peritonitis. Chicho Almanza had minimal improvement in symptoms and became more weak and dizzy.  Bone metastasis involving the left ilium and sacrum with impingement on spinal nerves S1 L5 causing left foot drop and marked dysesthesia poorly responsive to Decadron therapy is also a related diagnosis.  Persistent right mid back pain due to extension of the pancreatic tail primary cancer into the posterior gastric wall with partial relief from celiac block has caused acceleration of pain and patient is experiencing increasing nausea.  Multiple metastases to the liver and lungs showed radiologic response to FOLFIRINOX treatment September through February 2022.  The patient was seen in home by admission RN, on-call RN the following day, myself with case managing RN on 4/20/2022 and on-call RN 4/21/2022. Marco A Ocampo to adjust multiagent antinausea therapy and initiate up titration of Moxi, known IR to treat dry heaves and pain proved ineffective even though the patient was able to achieve modest catharsis.  He is unable to bear full weight on the left leg and movements in his own home is becoming increasingly paralyzed. Chicho Almanza has not left his own room or 10 days. Chicho Almanza is transferred to [facility] for 21 2022 when he is unable to ingest oral steroid or opioid therapy for pain management due to uncontrolled retching.  His life expectancy in a bedbound state with peritoneal carcinomatosis is less than 6 weeks.  Intent on admission is to achieve symptom control with parenteral opioid and antiemetic therapy.  Subsequently progressed to oral regimen for nausea and pain control which will allow the patient to return to his home for continued hospice care.      Standing Status:   Standing     Number of Occurrences:   1     Order Specific Question:   Please describe the test or procedure you would like to order. Answer:   Assess for GIP, routine, or respite level of care.  DO NOT RESUSCITATE     Standing Status:   Standing     Number of Occurrences:   1     Order Specific Question:   Comfort Measures Only? Answer: Yes    Movantik 25 mg tab tablet     Sig: Take 1 Tablet by mouth two (2) times daily as needed.  ondansetron hcl (ZOFRAN) 8 mg tablet     Sig: Take 8 mg by mouth three (3) times daily as needed.     HYDROmorphone (DILAUDID) injection 1 mg    haloperidol lactate (HALDOL) injection 4 mg    heparin (porcine) pf 500 Units    sodium chloride (NS) flush 10 mL    heparin (porcine) pf 300 Units    sodium chloride (NS) flush 10 mL    heparin (porcine) pf 300 Units    OR Linked Order Group     HYDROmorphone (DILAUDID) injection 1 mg     HYDROmorphone (DILAUDID) injection 1 mg    OR Linked Order Group     haloperidol lactate (HALDOL) injection 4 mg     haloperidol lactate (HALDOL) injection 4 mg    bisacodyL (DULCOLAX) suppository 10 mg    alum-mag hydroxide-simeth (MYLANTA) oral suspension 30 mL    DISCONTD: pantoprazole (PROTONIX) tablet 40 mg     Order Specific Question:   PPI INDICATION     Answer:   Symptomatic GERD    fentaNYL (DURAGESIC) 25 mcg/hr patch 1 Patch    dexamethasone (DECADRON) 4 mg/mL injection 4 mg    hyoscyamine (LEVSIN) 0.125mg/mL solution 250 mcg (Patient Supplied)    DISCONTD: metoclopramide HCl (REGLAN) injection 5 mg    DISCONTD: diphenhydrAMINE (BENADRYL) injection 50 mg    ondansetron (ZOFRAN) injection 4 mg    alum-mag hydroxide-simeth (MYLANTA) oral suspension 30 mL    pantoprazole (PROTONIX) tablet 40 mg     Order Specific Question:   PPI INDICATION     Answer:   Symptomatic GERD    senna (SENOKOT) tablet 17.2 mg    bisacodyL (DULCOLAX) suppository 10 mg    metoclopramide HCl (REGLAN) tablet 5 mg    INITIAL PHYSICIAN ORDER: HOSPICE Level Of Care: General Inpatient; Reason for Admission: refractory vomiting     Standing Status: Standing     Number of Occurrences:   1     Order Specific Question:   Status     Answer:   Hospice     Order Specific Question:   Level Of Care     Answer:   General Inpatient     Order Specific Question:   Reason for Admission     Answer:   refractory vomiting     Order Specific Question:   Inpatient Hospitalization Certified Necessary for the Following Reasons     Answer:   3. Patient receiving treatment that can only be provided in an inpatient setting (further clarification in H&P documentation)     Order Specific Question:   Admitting Diagnosis     Answer:   Refractory nausea and vomiting [4963870]     Order Specific Question:   Terminal Prognosis Diagnosis(es)     Answer:   Metastatic adenocarcinoma involving skeletal bone with unknown primary site [facility]) [[id number]]     Order Specific Question:   Terminal Prognosis Diagnosis(es)     Answer:   Pancreatic cancer metastasized to liver [facility]) [5106347]     Order Specific Question:   Terminal Prognosis Diagnosis(es)     Answer: Malignant ascites [789.51. ICD-9-CM]     Order Specific Question:   Admitting Physician     Answer:   Ness Nichols [[id number]]     Order Specific Question:   Attending Physician     Answer:   Sunil Organ     Order Specific Question:   Discharge Plan:     Answer: Other (Specify)    INITIAL PHYSICIAN ORDER: HOSPICE Level Of Care: General Inpatient; Reason for Admission: manage refractory nausea and vomitting     Standing Status:   Standing     Number of Occurrences:   1     Order Specific Question:   Status     Answer:   Hospice     Order Specific Question:   Level Of Care     Answer:   General Inpatient     Order Specific Question:   Reason for Admission     Answer:   manage refractory nausea and vomitting     Order Specific Question:   Inpatient Hospitalization Certified Necessary for the Following Reasons     Answer:   3.  Patient receiving treatment that can only be provided in an inpatient setting (further clarification in H&P documentation)     Order Specific Question:   Admitting Diagnosis     Answer:   Cancer, pancreas, body [facility]) [[id number]]     Order Specific Question:   Terminal Prognosis Diagnosis(es)     Answer:   Metastasis to bone [facility]) [[id number]]     Order Specific Question:   Terminal Prognosis Diagnosis(es)     Answer:   History of known metastasis to liver [1084997]     Order Specific Question:   Terminal Prognosis Diagnosis(es)     Answer: Malignant ascites [789.51. ICD-9-CM]     Order Specific Question:   Terminal Prognosis Diagnosis(es)     Answer:   Peritoneal carcinomatosis [facility]) [[id number]]     Order Specific Question:   Terminal Prognosis Diagnosis(es)     Answer:   Back pain [200702]     Order Specific Question:   Terminal Prognosis Diagnosis(es)     Answer:   Cancer-related breakthrough pain [3693548]     Order Specific Question:   Terminal Prognosis Diagnosis(es)     Answer:   Drug-induced ileus [facility] [[id number]]     Order Specific Question:   Terminal Prognosis Diagnosis(es)     Answer:   Paroxysmal atrial fibrillation with rapid ventricular response [facility]) [[id number]]     Order Specific Question:   Terminal Prognosis Diagnosis(es)     Answer:   Peripheral neuropathy due to and not concurrent with chemotherapy [facility]) [[id number]]     Order Specific Question:   Admitting Physician     Answer:   Rico Room     Order Specific Question:   Attending Physician     Answer:   Rico Room     Order Specific Question:   Discharge Plan:     Answer:   Home with Home Hospice       Allergies:  No Known Allergies    Care Plan:  04/15/2022 Metastatic Adenocarcinoma of Pancreas Problems (Active)     Problem: Anticipatory Grief     Dates: Start: 04/18/22       Description: Patient/family grief (anticipatory, past, present)    Disciplines: Interdisciplinary    Goal: Grief heard and acknowledged, anxiety reduced, patient coping identified, patient/family expressed gratitude     Dates: Start: 04/18/22       Description: GOAL:  Pt Leidy De La Torre and wife Florin Lyles will express feelings of grief, gratitude, anger, hope, etc. rooted in their [address] and their love for each other, during benefit period. 4/18/22  Progress AEB pt Leidy De La Torre saying he is not happy to be terminal but is reconciled to it and intending to have a positive attitude. Disciplines: Interdisciplinary    Intervention: Support grieving process     Dates: Start: 04/18/22       Description: Address feelings of loss, anticipatory grief, expression of concerns. Pt/family will demonstrate ability to cope with loss and grief       Intervention: Assess grief responses     Dates: Start: 04/18/22       Description: Assess pt/family for feelings of grief             Problem: Community Resource Needs     Dates: Start: 04/18/22       Description: Deficit related to resource support. Disciplines: Interdisciplinary    Goal: Patient is receiving increased resource support to enhance ability to remain at home     Dates: Start: 04/18/22       Description: Patient/spouse is receiving increased resource support to enhance ability to remain at home AEB pt/spouse verbalize understanding of available community resources within certification benefit ending 7/13/22. Disciplines: Interdisciplinary    Intervention: Provide assistance with identifying appropriate community resources     Dates: Start: 04/18/22       Description: Provide assistance with identifying appropriate community resources             Problem: Emotional Support Needs     Dates: Start: 04/18/22       Description: Deficit related to emotional support. Disciplines: Interdisciplinary    Goal: Patient/family is receiving emotional support     Dates: Start: 04/18/22       Description: Patient/spouse is receiving emotional support AEB pt/spouse to demonstreate improved coping and verbalize emotional needs throughout certifiaction ending 7/13/22.     Disciplines: Interdisciplinary    Intervention: Provide emotional support Dates: Start: 04/18/22       Description: Provide emotional support       Intervention: Provide emotional support of the family's cultural expressions of grief and loss     Dates: Start: 04/18/22       Description: Provide emotional support of the family's cultural expressions of grief, loss, and response to illness. Intervention: Assess for emotional distress     Dates: Start: 04/18/22       Description: Assess for emotional distress             Problem: GI dysfunction due to end of life     Dates: Start: 04/20/22       Description: Grisel Becerra has intractable nausea and vomiting due to disease process    Disciplines: Interdisciplinary    Goal: *Palliation of nausea/vomiting (Palliative Care)     Dates: Start: 04/20/22       Description: Clayton's symptoms of nausea and vomiting will be controlled using aggressive measures within 48 hours    Disciplines: Interdisciplinary    Intervention: Manage nausea and vomiting     Dates: Start: 04/20/22       Description: Grisel Becerra to be transported to [facility] for aggressive symptom management of his nausea and vomiting related to his Pancreatic Cancer today 4/20/22             Problem: Hospice Orientation     Dates: Start: 04/15/22       Description: Patient and PCG require hospice orientation r/t patient admission to [facility] for metastatic adenocarcinoma of pancreas    Disciplines: Interdisciplinary    Goal: Demonstrate understanding of hospice philosophy, plan of care, and home hospice program     Dates: Start: 04/15/22   End: 05/15/22    Description: PCG Nico NewtonNury and Grisel Becerra will demonstrate understanding of the hospice philosophy, plan of care and the home hospice program as evidenced by participation in meeting Clayton's psychosocial, spiritual, medical, and physical needs inclusive of medical supplies/equipment focusing on symptoms within one month.        Disciplines: Interdisciplinary    Intervention: Instruct: hospice orientation     Dates: Start: 04/15/22   End: 05/15/22    Description: Instruct patient/caregiver on hospice orientation             Problem: Infection Prevention     Dates: Start: 04/15/22       Description: PCG requires continued education on prevention of infection r/t immunocompromised patient    Disciplines: Interdisciplinary    Goal: *Prevention of infection     Dates: Start: 04/15/22   End: 05/15/22    Description: AUTUMN Nazario) will verbalize understanding and demonstrate ways to prevent infection within 30 days. Disciplines: Interdisciplinary    Intervention: Instruct infection prevention     Dates: Start: 04/15/22   End: 05/15/22    Description: Instruct PCG on the importance of infection prevention measures, such as proper hand hygiene technique, use of appropriate PPE, cleaning environment and disinfecting surfaces. Problem: Knowledge deficit related to High Risk Medications     Dates: Start: 04/15/22       Description: PCG requires continued education on use of insulin r/t patient pescribed insulin secondary to diabetes mellitus type 2    Disciplines: Interdisciplinary    Goal: Patient/caregiver will demonstrate knowledge of insulin medication(s)     Dates: Start: 04/15/22   End: 04/29/22    Description: AUTUMN Nazario) will demonstrate knowledge of insulin medication as evidenced by teaching back purpose and scheduling of medication and stating at least one risk/reportable side effect and how to mitigate it within 2 week(s). Disciplines: Interdisciplinary    Intervention: Instruct on insulin medication(s)     Dates: Start: 04/15/22   End: 04/29/22    Description: Instruct caregiver on insulin medication(s) purpose, dose, appearance, preparation, storage, and scheduling, as well as s/sx of hypoglycemia (shakiness, dizziness, sweating, confusion, and irritability) and hyperglycemia (nausea/vomiting, shortness of breath, fruity-smelling breath, weakness, and dry mouth).              Problem: Management of Home Medications Dates: Start: 04/15/22       Description: PCG requires continued education on management of home medications    Disciplines: Interdisciplinary    Goal: Knowledge of medication management     Dates: Start: 04/15/22   End: 04/29/22    Description: AUTUMN Thornton) will follow prescribed medication therapy and schedule, and verbalize understanding of purpose and side effects of medication within 2 weeks. Disciplines: Interdisciplinary    Intervention: Instruct on management of home medications     Dates: Start: 04/15/22   End: 04/29/22    Description: Instruct caregiver in medication administration, purpose, dosages, preparation, scheduling, side effects, food/drug interactions, storage, and potential complications. Goal: *Complies with medication therapy     Dates: Start: 04/15/22   End: 07/14/22    Description: AUTUMN Thornton) will receive and comply with hospice prescribed medications AEB symptoms are being managed during this benefit period. Disciplines: Interdisciplinary    Intervention: Medication reconciliation     Dates: Start: 04/15/22   End: 07/14/22    Description: Perform medication reconciliation every visit and update patient's med list copy for new,  changed, discontinued medications as needed . Assess for effectiveness and compliance with medications every visit. Problem: Pain     Dates: Start: 04/15/22       Description: Patient experiencing chronic pain r/t metastatic cancer    Disciplines: Interdisciplinary    Goal: Assess satisfaction of level of comfort and symptom control     Dates: Start: 04/15/22   End: 07/14/22    Description: Dev Payne and/or AUTUMN Thornton) will verbalize satisfaction with Clayton's level of comfort and symptom control AEB verbalization of chronic pain to an acceptable level of 3/10 or less throughout current hospice benefit period.     Disciplines: Interdisciplinary    Intervention: Assess effectiveness of pain management     Dates: Start: 04/15/22   End: 07/14/22    Description: Assess effectiveness of pain management for patient each visit             Problem: Portacath     Dates: Start: 04/16/22       Description: Patient has a Portacath to R Upper Chest r/t hx of chemotherapy    Disciplines: Interdisciplinary    Goal: Knowledge of catheter care     Dates: Start: 04/16/22   End: 07/15/22    Description: Jacy Armstrong will have no complications to VAD site throughout this benefit period. Disciplines: Interdisciplinary    Intervention: CATHETER CARE     Dates: Start: 04/16/22   End: 07/15/22    Description: PORTACATH R Upper Chest:  Hospice RN to Flush with 10cc NS followed by 300 units of Heparin intracatheter once monthly. Problem: Risk for Falls     Dates: Start: 04/15/22       Description: Patient is at risk for falls r/t MAHC-10 score of 7/10    Disciplines: Interdisciplinary    Goal: Knowledge of fall prevention     Dates: Start: 04/15/22   End: 05/15/22    Description: PCG Kanu Crocker) and Jacy Armstrong will demonstrate use of safety precautions to prevent falls and improve overall patient safety within one month. Disciplines: Interdisciplinary    Intervention: Instruct on fall prevention     Dates: Start: 04/15/22   End: 05/15/22    Description: Assess recent falls every visit. Instruct patient/caregiver in fall prevention strategies: use of assistive device, adequate lighting, and monitor medication that may alter mental status. Document and report falls. Problem: Spiritual Evaluation     Dates: Start: 04/18/22       Description: Identify patient/family spiritual or [employer] resources    Disciplines: Interdisciplinary    Goal: Identify beliefs/practices that support hospice experience (Resolved)     Dates: Start: 04/18/22   End: 04/18/22    Description: GOAL:  Patient/family identify their beliefs/practices that impair/support Hospice experience. 4/18/22 Completed AEB pt Clayton's and wife Em's attesting to their Pyre[employer]. Disciplines: Interdisciplinary         Encounter Problems (Active)     Problem: Anticipatory Grief     Dates: Start: 04/20/22       Disciplines: Interdisciplinary    Goal: Explore reactions to and verbalize acceptance of impending loss     Dates: Start: 04/20/22   Expected End: 04/23/22       Description: Patient/family/caregiver will explore reactions to and verbalize acceptance of impending loss. Disciplines: Interdisciplinary    Intervention: Assess grief responses     Dates: Start: 04/20/22          Intervention: Assist with grief counseling     Dates: Start: 04/20/22       Description:  to assist.       Intervention: James Chaparro on stages of grief     Dates: Start: 04/20/22       Description: Instruct patient/caregiver on stages of grief. Intervention: Offer grief support group     Dates: Start: 04/20/22       Description: Patient/family/caregiver will explore reactions to and verbalize acceptance of impending loss. Problem: Anticipatory Grief     Dates: Start: 04/21/22       Disciplines: Interdisciplinary    Goal: Grief heard and acknowledged, anxiety reduced, patient coping identified, patient/family expressed gratitude     Dates: Start: 04/21/22   Expected End: 04/29/22       Description: GOAL: Jeff Horn and Saw Morejon ( brother)   will demonstrate appropriate anticipatory grief reactions related to Anas  impending death as evidenced by  their ability to verbalize feelings of denial, bargaining, anger, and depression, display feelings and associated behaviors in an acceptable manner during inpatient hospice stay. Disciplines: Interdisciplinary    Intervention: Assess grief responses     Dates: Start: 04/21/22       Description: Aby Bates will assess grief reactions with each encounter.         Intervention: Support grieving process     Dates: Start: 04/21/22       Description: Aby Bates will support the grief process by providing a safe space for open, nonjudgmental communication and education on anticipatory grief. Problem: Discharge Planning     Dates: Start: 04/20/22       Disciplines: Interdisciplinary    Goal: *Participates in discharge planning     Dates: Start: 04/20/22   Expected End: 04/23/22       Description: Patient Stephanie Clements Pt, and pt's family/ care giver will receive support from community resources through the support of SW such as SNF placements, SW calling facilities for the family, updating the family/care giver on facilities, and sending out referrals for pt and family/ care giver. Disciplines: Interdisciplinary    Intervention: Healthcare knowledge assessment to include dying process, prognosis, treatment regimen, medications upon discharge     Dates: Start: 04/20/22          Intervention: Advanced care planning     Dates: Start: 04/20/22          Intervention: Identify support systems     Dates: Start: 04/20/22                Problem: Emotional Support Needs     Dates: Start: 04/21/22       Disciplines: Interdisciplinary    Goal: Patient/family is receiving emotional support     Dates: Start: 04/21/22   Expected End: 04/29/22       Description: Pt, pt's spouse Sancho Courtney, pt's brother Chance Foss, and pt's family/friends will receive emotional support from SW weekly through weekly check-ins, education on the hospice philosophy, active listening, rapport building, and validation of feelings throughout pt's hospice journey as evidenced by their voiced understanding of LOC changes and room and board charges, lack of heightened emotional responses, and acknowledgement of SW services available. Disciplines: Interdisciplinary    Intervention: Assess for emotional distress     Dates: Start: 04/21/22       Description: SW will assess for emotional distress in pt, Twyla Reneeg, and family/friends through the use of open ended questions, motivational interviewing techniques, and observation/assessment of verbal and nonverbal cues.         Intervention: Provide emotional support of the family's cultural expressions of grief and loss     Dates: Start: 04/21/22       Description: SW will provide emotional support of the family's cultural expression of grief, loss, and response to illness. Problem: End of Life Process     Dates: Start: 04/20/22       Disciplines: Interdisciplinary    Goal: Demonstrate understanding of end of life processes     Dates: Start: 04/20/22   Expected End: 04/23/22       Description: Brooke Allen will understand end of life processes. Disciplines: Interdisciplinary    Intervention: Assess for signs/symptoms of terminal restlessness     Dates: Start: 04/20/22          Intervention: Implement strategies to reduce terminal restlessness     Dates: Start: 04/20/22          Intervention: Instruct on the dying process     Dates: Start: 04/20/22          Intervention: Instruct: imminent death     Dates: Start: 04/20/22          Intervention: Instruct: process at end of life     Dates: Start: 04/20/22                Problem: Falls - Risk of     Dates: Start: 04/21/22       Disciplines: Interdisciplinary    Goal: *Absence of Falls     Dates: Start: 04/21/22   Expected End: 04/24/22       Description: Patient Fatoumata Clements will remain free from falls AEB no injuries r/t falls each shift during inpatient hospice stay. Disciplines: Interdisciplinary          Problem: Infection - Risk of, Central Venous Catheter-Associated Bloodstream Infection     Dates: Start: 04/20/22       Disciplines: Interdisciplinary    Goal: *Absence of infection signs and symptoms     Dates: Start: 04/20/22   Expected End: 04/23/22       Description: Patient Fatoumata Clements will remain free from infection R/T Central Venous Line AEB absence of signs and symptoms of infection each shift during inpatient hospice stay.          Disciplines: Interdisciplinary    Intervention: Central venous catheter site(s) assessment (eg: Pain; color; exudate; edema; odor; skin integrity)     Dates: Start: 04/20/22          Intervention: Infection assessment -general (eg: Onset of weakness; white blood cells; shivering; hyper/hypoglycemia; temp/weight/energy/mental status altered; tachycardia; hypotension; tachypnea; respiratory)     Dates: Start: 04/20/22          Intervention: Central venous catheter management per bundle (eg: Lines maintained; port sterility maintained; dressing changed per policy and procedure)     Dates: Start: 04/20/22                Problem: Nausea/Vomiting (Adult)     Dates: Start: 04/20/22       Disciplines: Interdisciplinary    Goal: *Absence of nausea/vomiting     Dates: Start: 04/20/22   Expected End: 04/23/22       Description: Patient Andrae Clements will exhibit decreased or eliminated episodes of Nausea/Vomiting AEB less than 2 PRN antiemetic medication administration each shift during inpatient hospice stay. Disciplines: Interdisciplinary    Intervention: Aspiration risk - signs and symptoms (eg: Ineffective cough; altered level of consciousness; impaired mobility; drooling; poor dentition; vomiting; slurred speech; prolonged supine)     Dates: Start: 04/20/22          Intervention: Nonpharmacologic nausea management (eg:  Consistent room temperature; deep breathing; distraction; ice chips; minimal moving; pain management)     Dates: Start: 04/20/22          Intervention: Administer antiemetics as needed     Dates: Start: 04/20/22                Problem: Pain     Dates: Start: 04/20/22       Disciplines: Interdisciplinary    Goal: Assess satisfaction of level of comfort and symptom control     Dates: Start: 04/20/22   Expected End: 04/23/22       Description: Patient Andrae Clements will exhibit decrease in pain AEB rating pain less than 3 on 1-10 scale or 3 FLACC score within each shift of receiving pain medication during inpatient hospice stay.         Disciplines: Interdisciplinary    Intervention: Assess effectiveness of pain management Dates: Start: 04/20/22          Intervention: Assess for signs/symptoms of acute pain     Dates: Start: 04/20/22          Intervention: Assess for signs/symptoms of chronic pain     Dates: Start: 04/20/22          Intervention: Implement non-pharmacological pain management     Dates: Start: 04/20/22          Intervention: Instruct on pain scales     Dates: Start: 04/20/22          Intervention: Implement pharmacological pain management     Dates: Start: 04/20/22                Problem: Patient Education: Go to Patient Education Activity     Dates: Start: 04/21/22       Disciplines: Interdisciplinary    Goal: Patient/Family Education     Dates: Start: 04/21/22   Expected End: 04/23/22       Disciplines: Interdisciplinary          Problem: Pressure Injury - Risk of     Dates: Start: 04/20/22       Disciplines: Interdisciplinary    Goal: *Prevention of pressure injury     Dates: Start: 04/20/22   Expected End: 04/23/22       Description: Patient Fatoumata Clements will remain free from acquired pressure injuries AEB zero acquired pressure injuries during assessment of skin each shift during inpatient hospice stay. Disciplines: Interdisciplinary          Problem: Risk for Falls     Dates: Start: 04/20/22       Disciplines: Interdisciplinary    Goal: Free of falls during inpatient stay     Dates: Start: 04/20/22   Expected End: 04/23/22       Description: Patient Fatoumata Clements will remain free from falls AEB no injuries r/t falls each shift during inpatient hospice stay. Disciplines: Interdisciplinary    Intervention: Assess fall risk     Dates: Start: 04/20/22       Description: Complete fall risk assessment on admission, recertification, and as indicated on fall.        Intervention: Instruct on fall prevention     Dates: Start: 04/20/22       Description: Call for assistance with ambulation and transfers during inpatient stay             Problem: Spiritual Evaluation     Dates: Start: 04/21/22 Disciplines: Interdisciplinary    Goal: Identify beliefs/practices that support hospice experience     Dates: Start: 04/21/22   Expected End: 04/29/22       Description: Goal:  Castro Quick and family will verbalize experiencing peace and calmness about death and the afterlife based on their beliefs/ cain tradition. Calmness evidenced by normal respirations and lack of reported anxiety and an ability to communicate hope during Clayton's time with [facility]. Disciplines: Interdisciplinary    Intervention: Assess spiritual needs     Dates: Start: 04/21/22       Description: [address] will assess patient/ family spiritual needs. Intervention: Assist with spiritual resources     Dates: Start: 04/21/22       Description: [address] will provide spiritual resources needed to support patient and family's spiritual needs. Intervention: Provide spiritual support     Dates: Start: 04/21/22       Description: [address] will provide spiritual and emotional support throughout Mr. Clements's time with [facility].              Care Plan Problems/Goals  Report    2 of 15 Goals Resolved/Met 2 of 15 Resolved/Met     Progressing Towards Goal (12)      *Prevention of pressure injury (Pressure Injury - Risk of)    Disciplines:  Interdisciplinary Expected end:  04/23/22        Outcome: Progressing Towards Goal By Eamon Porter RN on 04/22/22 0321            Free of falls during inpatient stay (Risk for Falls)    Disciplines:  Interdisciplinary Expected end:  04/23/22        Outcome: Progressing Towards Goal By Michelle Haro RN on 04/21/22 1255            Assess satisfaction of level of comfort and symptom control (Pain)    Disciplines:  Interdisciplinary Expected end:  04/23/22        Outcome: Progressing Towards Goal By Eamon Porter RN on 04/22/22 0321            Explore reactions to and verbalize acceptance of impending loss (Anticipatory Grief)    Disciplines:  Interdisciplinary Expected end:  04/23/22 Outcome: Progressing Towards Goal By Jihan Pagan RN on 04/20/22 1716            *Absence of nausea/vomiting (Nausea/Vomiting (Adult))    Disciplines:  Interdisciplinary Expected end:  04/23/22        Outcome: Progressing Towards Goal By Mode Carr RN on 04/22/22 0321            *Absence of infection signs and symptoms (Infection - Risk of, Central Venous Catheter-Associated Bloodstream Infection)    Disciplines:  Interdisciplinary Expected end:  04/23/22        Outcome: Progressing Towards Goal By Mode Carr RN on 04/22/22 0321            Demonstrate understanding of end of life processes (End of Life Process)    Disciplines:  Interdisciplinary Expected end:  04/23/22        Outcome: Progressing Towards Goal By Jihan Pagan RN on 04/20/22 1716            *Participates in discharge planning (Discharge Planning)    Disciplines:  Interdisciplinary Expected end:  04/23/22        Outcome: Progressing Towards Goal By You Bailon RN on 04/21/22 1255            *Absence of Falls (Falls - Risk of)    Disciplines:  Interdisciplinary Expected end:  04/24/22        Outcome: Progressing Towards Goal By You Bailon RN on 04/21/22 1255            Patient/family is receiving emotional support (Emotional Support Needs)    Disciplines:  Interdisciplinary Expected end:  04/29/22        Outcome: Progressing Towards Goal By Roswell Cranker, LMSW on 04/21/22 1151            Identify beliefs/practices that support hospice experience (Spiritual Evaluation)    Disciplines:  Interdisciplinary Expected end:  04/29/22        Outcome: Progressing Towards Goal By Tyler Amato on 04/21/22 1411            Grief heard and acknowledged, anxiety reduced, patient coping identified, patient/family expressed gratitude (Anticipatory Grief)    Disciplines:  Interdisciplinary Expected end:  04/29/22        Outcome: Progressing Towards Goal By Tyler Amato on 04/21/22 1506                         No Outcome (1)      Patient/Family Education (Patient Education: Go to Patient Education Activity)    Disciplines:  Interdisciplinary Expected end:  04/23/22                       Resolved/Met (2)      Patient/Family Education (Patient Education: Go to Patient Education Activity)    Disciplines:  Interdisciplinary Expected end:  04/23/22        Outcome: Resolved/Met By Erik Lara RN on 04/20/22 1716            Demonstrate understanding of hospice philosophy, plan of care, and home hospice program [facility] Orientation)    Disciplines:  Interdisciplinary Expected end:  04/23/22        Outcome: Resolved/Met By Caesar Centeno RN on 04/21/22 1255                              ___________________    Care Team Notes          POC/IDG Notes      [facility] IDG  Notes by Thurmon Severin at 04/22/22 1027  Version 1 of 1    Author: Thurmon Severin Service: Spiritual Care Author Type: Pastoral Care    Filed: 04/22/22 1031 Date of Service: 04/22/22 1027 Status: Signed    : Thurmon Severin (Pastoral Care)       Patient: Nicole Dodson Starr    Date: 04/22/22  Time: 10:27 AM    [facility]  Notes  Intervention: Ministry of presence. Spiritual and Bereavement Assessments completed. Support provided for wife through active listening, affirmation of Blessing, compassion and prayer. Outcome: June To was able to share their story. She was tearful at times but appropriately expressing her anticipatory grief. Plan: Continue to provide spiritual and emotional support. Focus on Anticipatory Grief. Signed by: Stefanie ABBOTT [facility] IDG Nurse Notes by Alberto Brenner RN at 04/22/22 1012  Version 1 of 1    Author: Alberto Brenner RN Service: NURSING Author Type: Registered Nurse    Filed: 04/22/22 1024 Date of Service: 04/22/22 1012 Status: Signed    : Alberto Brenner RN (Registered Nurse)         Patient: Nicole Dodson Starr    Date: 04/22/22  Time: 10:12 AM    [facility] Nurse Notes  1st  IDG: Pt is a 63 y.o. year-old male with pancreatic cancer who is here [facility] level of care for management of pain, nausea and vomiting. Continent and producing urine   IV access: Implanted port   PO intake:Diet as tolerated  Oxygen: Room air  Wounds: None   PRN medications: Dilaudid 1 mg IV x 1 dose for pain. Scheduled meds:    Current Facility-Administered Medications   Medication Dose Route Frequency    pantoprazole (PROTONIX) tablet 40 mg  40 mg Oral ACB&D    sodium chloride (NS) flush 10 mL  10 mL InterCATHeter Q12H    heparin (porcine) pf 300 Units  300 Units InterCATHeter Q12H    fentaNYL (DURAGESIC) 25 mcg/hr patch 1 Patch  1 Patch TransDERmal Q72H    dexamethasone (DECADRON) 4 mg/mL injection 4 mg  4 mg IntraVENous Q8H    metoclopramide HCl (REGLAN) injection 5 mg  5 mg IntraVENous Q6H     Plan: Add Senna 4 tab Q D/ Add dulcolax suppository / Change Reglan 5 mg PO AC and HS. Comprehensive plan of care reviewed. IDG and pt./family in agreement with plan of care. The IDG identifies through on-going assessment when a change is needed to the POC; the pt/family will receive care and services necessitated by changes in POC. Medications reviewed by the pharmacist and Medical Director. Signed by: Allie Parry RN       [facility] IDG  Notes by Precious Dhaliwal LMSW at 04/22/22 1015  Version 1 of 1    Author: Precious Dhaliwal LMSW Service: -- Author Type: Licensed Masters in Social Work    Filed: 04/22/22 1016 Date of Service: 04/22/22 1015 Status: Signed    : Radha Tompkins [facility] [address] (Licensed Masters in Social Work)       Patient: Peggy Marks Starr    Date: 04/22/22  Time: 10:15 AM    [facility]  Notes  Pt remains under [facility] LOC. If pt LOC changes, this discussion will be had with pt's spouse Chelsea High and she would like this to be a private conversation first before addressing with pt. No changes in the plan of care.  SW will continue to provide ongoing emotional support and assessment of psychosocial and bereavement concerns, as well as needs until discharge. Signed by: Chilo Cedillo LMSW       [facility] IDG  Notes by Dena Portillo at 04/20/22 1456  Version 1 of 1    Author: Dena Portillo Service: Spiritual Care Author Type: Pastoral Care    Filed: 04/20/22 1456 Date of Service: 04/20/22 1456 Status: Signed    : Dena Portillo (Pastoral Care)       Patient: Zney Mcknightrell    Date: 04/20/22  Time: 2:56 PM    [facility]  Notes    / Grief Counselor has reviewed  Initial Comprehensive Assessment and plan of care. Bereavement and Spiritual Care Assessments to be completed and plan of care put in place to meet patient and family needs. Signed by: Lelia Wheat       [facility] IDG Nurse Notes by Edgar Miranda RN at 04/20/22 1246  Version 1 of 1    Author: Edgar Miranda RN Service: -- Author Type: Registered Nurse    Filed: 04/20/22 1302 Date of Service: 04/20/22 1246 Status: Signed    : Edgar Miranda RN (Registered Nurse)       Patient: Zeny Doyle Starr    Date: 04/20/22  Time: 12:46 PM    [facility] Nurse Notes  Pt arrives via [address] [place] [address] for Pancreatic Cancer, symptom management is for pain, nausea, and vomiting. Pt is alert and oriented x 4 c/o pain and nausea. He states that he does not have any energy and feels fatigued. Pt states that he has uncontrollable vomiting and a dry mouth. Last oral intake was a small amount of pudding on 4/19/2022 and no liquids. Pt to have 1 tsp of ice chips every 30 minutes other than that NPO. Pts breathing is regular non-labored, port on right side of chest. Port has not been used in >30d. Accessed, flushed with saline/heparin and patent. Abdomen distended with ascites, increased pain on palpation. Pt states his last bowel movement was 7 days prior. Bowel sounds hypoactive. Pts able to control bowel and bladder and is up to bedside commode. Pt wears a brief at times but does not have one on currently.   Spine and buttocks redness noted. No breaks in skin, and blanchable. Pt is on a regular mattress. Upper extremities no edema noted, lower extremities +1 pitting edema in the feet only. Pt moves all extremities x4 with intent. Pt has a fentanyl 25mcg patch on right upper arm, placed by [facility] home RN, with orders to keep this patch in place. Pt NOK, Spouse Em Clements at bedside. Pts family oriented to [facility]. Signed by: Brigido Chappell RN       [facility] IDG  Notes by Martin Darling LMSW at 04/20/22 1245  Version 1 of 1    Author: Martin Darling LMSW Service: -- Author Type: Licensed Masters in Social Work    Filed: 04/20/22 1245 Date of Service: 04/20/22 1245 Status: Signed    : Martin Darling [address] [facility] (Licensed Masters in Social Work)       Patient: Yon Clements    Date: 04/20/22  Time: 12:45 PM    [facility]  Notes  SW has read the initial comprehensive assessment and plan of care. No immediate needs noted. Initial SW assessment visit will be completed within 5 days of admission. Signed by: Donny Garza LMSW                Care Team Present:   Team Members Present: Gladis Henao (Comment)  Physician Team Member: Dr. Tere Ledesma  Nurse Team Member: Linette Spears  Social Work Team Member: Donny Garza   Team Member: Vinayak Cartagena  Other Discipline Present (Name):  Lucas Morrison NP

## 2022-04-22 NOTE — [facility] IDG SOCIAL WORKER NOTES
Patient: Azam Clements    Date: 04/22/22  Time: 10:15 AM    [facility]  Notes  Pt remains under [facility] LOC. If pt LOC changes, this discussion will be had with pt's spouse Tracy Anderson and she would like this to be a private conversation first before addressing with pt. No changes in the plan of care. SW will continue to provide ongoing emotional support and assessment of psychosocial and bereavement concerns, as well as needs until discharge.          Signed by: Demetrius Akbar LMSW

## 2022-04-22 NOTE — [facility] IDG NURSE NOTES
Patient: Fernando Clements    Date: 04/22/22  Time: 10:12 AM    [facility] Nurse Notes  1st  IDG: Pt is a 63y.o. year-old male with pancreatic cancer who is here [facility] level of care for management of pain, nausea and vomiting. Continent and producing urine   IV access: Implanted port   PO intake:Diet as tolerated  Oxygen: Room air  Wounds: None   PRN medications: Dilaudid 1 mg IV x 1 dose for pain. Scheduled meds:    Current Facility-Administered Medications   Medication Dose Route Frequency    pantoprazole (PROTONIX) tablet 40 mg  40 mg Oral ACB&D    sodium chloride (NS) flush 10 mL  10 mL InterCATHeter Q12H    heparin (porcine) pf 300 Units  300 Units InterCATHeter Q12H    fentaNYL (DURAGESIC) 25 mcg/hr patch 1 Patch  1 Patch TransDERmal Q72H    dexamethasone (DECADRON) 4 mg/mL injection 4 mg  4 mg IntraVENous Q8H    metoclopramide HCl (REGLAN) injection 5 mg  5 mg IntraVENous Q6H     Plan: Add Senna 4 tab Q D/ Add dulcolax suppository / Change Reglan 5 mg PO AC and HS. Comprehensive plan of care reviewed. IDG and pt./family in agreement with plan of care. The IDG identifies through on-going assessment when a change is needed to the POC; the pt/family will receive care and services necessitated by changes in POC. Medications reviewed by the pharmacist and Medical Director.         Signed by: Samson Quintero RN

## 2022-04-22 NOTE — PROGRESS NOTES
Problem: Pressure Injury - Risk of  Goal: *Prevention of pressure injury  Description: Feliciano Clements will remain free from acquired pressure injuries AEB zero acquired pressure injuries during assessment of skin each shift during inpatient hospice stay. Outcome: Progressing Towards Goal  Note: Pressure Injury Interventions:  Sensory Interventions: Assess changes in LOC,Assess need for specialty bed,Float heels,Keep linens dry and wrinkle-free,Minimize linen layers    Moisture Interventions: Absorbent underpads,Apply protective barrier, creams and emollients,Limit adult briefs,Minimize layers,Moisture barrier    Activity Interventions: Chair cushion    Mobility Interventions: Pressure redistribution bed/mattress (bed type)    Nutrition Interventions: Discuss nutritional consult with provider    Friction and Shear Interventions: Apply protective barrier, creams and emollients,Lift sheet,Minimize layers                Problem: Pain  Goal: Assess satisfaction of level of comfort and symptom control  Description: Feliciano Clements will exhibit decrease in pain AEB rating pain less than 3 on 1-10 scale or 3 FLACC score within each shift of receiving pain medication during inpatient hospice stay. Outcome: Progressing Towards Goal     Problem: Nausea/Vomiting (Adult)  Goal: *Absence of nausea/vomiting  Description: Patient Fernando Clements will exhibit decreased or eliminated episodes of Nausea/Vomiting AEB less than 2 PRN antiemetic medication administration each shift during inpatient hospice stay. Outcome: Progressing Towards Goal     Problem: Infection - Risk of, Central Venous Catheter-Associated Bloodstream Infection  Goal: *Absence of infection signs and symptoms  Description: Feliciano Clements will remain free from infection R/T Central Venous Line AEB absence of signs and symptoms of infection each shift during inpatient hospice stay.        Outcome: Progressing Towards Goal

## 2022-04-22 NOTE — PROGRESS NOTES
4573- patient in restroom upon rounding. patient assisted x 3 back bed from bathroom. He was unable to urinate. Encouraged him to rest for now, but he understands that he will need to have catheter placed. It is reported from prior RN that patient unable to void since Wednesday and bladder scan reveals 351 ml in bladder. No needs noted. Spouse at bedside, call light within reach. Patient is currently under GIP level of care appropriately, and expected to pass at [facility]. Will continue to assess need for change in LOC / discharge and collaborate with IDG team accordingly. 8374- patient sleeping and awakened for medication. Patient tolerated med whole. No needs noted at this time. 1645- patient only able to drink OJ for breakfast with mild nausea as a result. Denies need for medication. Wife at bedside scratching patient as he is itching. 1048- scheduled medications administered. Patient and family updated on medication changes. patient sitting up on side of bed to take his pills. Spitting into green emesis bag.    1223- chest port dressing is loose,  Changed at this time. scheduled dulcolax administered. Wife, Brook Hopkins reports last BM was 4/11 and it was small. No other needs at this time. 1440- patient resting quietly. Pulled up in bed with assistance from wife. No needs noted at this time. flacc 0/10.    1503- prn dilaudid and prn ativan given iv prior to ordonez placement. Scheduled decadron administered. 16 [place]ese ordonez placed. Patient tolerated poorly, but quickly felt relief after placement. Pt drowsy post medication. Turned to L side. Zinc cream applied to sacrum as sacrum is red, but blanchable. Patient has been c/o of pain to buttocks. 1720- patient sleeping. Brother at bedside. symtptoms are managed. 1850- patient hallucinating, agitated, trying to pull ordonez and fidgeting with gown. Prn haldol administered for agitation. Wife at bedside. Patient turned to L side. Tab alert attached.     Report given to Glory Francois

## 2022-04-22 NOTE — PROGRESS NOTES
Problem: Pressure Injury - Risk of  Goal: *Prevention of pressure injury  Description: Patient Stephanie Jaquez. Starr will remain free from acquired pressure injuries AEB zero acquired pressure injuries during assessment of skin each shift during inpatient hospice stay. Outcome: Progressing Towards Goal  Note: Pressure Injury Interventions:  Sensory Interventions: Assess changes in LOC,Assess need for specialty bed,Float heels,Keep linens dry and wrinkle-free,Minimize linen layers    Moisture Interventions: Absorbent underpads,Apply protective barrier, creams and emollients,Limit adult briefs,Minimize layers,Moisture barrier    Activity Interventions: Assess need for specialty bed    Mobility Interventions: Assess need for specialty bed    Nutrition Interventions: Document food/fluid/supplement intake    Friction and Shear Interventions: Apply protective barrier, creams and emollients,Lift sheet,Minimize layers      Problem: Risk for Falls  Goal: Free of falls during inpatient stay  Description: Patient Stephanie Jaquez. Starr will remain free from falls AEB no injuries r/t falls each shift during inpatient hospice stay. Outcome: Progressing Towards Goal     Problem: Pain  Goal: Assess satisfaction of level of comfort and symptom control  Description: Patient Stephanie Jaquez. Starr will exhibit decrease in pain AEB rating pain less than 3 on 1-10 scale or 3 FLACC score within each shift of receiving pain medication during inpatient hospice stay. Outcome: Progressing Towards Goal     Problem: Nausea/Vomiting (Adult)  Goal: *Absence of nausea/vomiting  Description: Patient Stephanie Jaquez. Starr will exhibit decreased or eliminated episodes of Nausea/Vomiting AEB less than 2 PRN antiemetic medication administration each shift during inpatient hospice stay.     Outcome: Progressing Towards Goal     Problem: Infection - Risk of, Central Venous Catheter-Associated Bloodstream Infection  Goal: *Absence of infection signs and symptoms  Description: Patient Clay Clements will remain free from infection R/T Central Venous Line AEB absence of signs and symptoms of infection each shift during inpatient hospice stay. Outcome: Progressing Towards Goal     Problem: Infection - Risk of, Urinary Catheter-Associated Urinary Tract Infection  Goal: *Absence of infection signs and symptoms  Description: Patient Clay Clements will remain free from infection R/T urinary catheter AEB absence of signs and symptoms of infection each shift during inpatient hospice stay.      Outcome: Progressing Towards Goal

## 2022-04-22 NOTE — PROGRESS NOTES
:  Patient report from Joel Bourgeois RN. Patient ID by name and . Patient admitted for GIP LOC for hospice diagnosis of pancreatic cancer to manage pain, nausea, and vomiting. Discharge plan is for patient to remain in [facility] until demise. Discharge plans to be evaluated for potential LOC change. RN reviewed POC with CNA. Patient in bed with wife at bedside. Assisted [facility], RN to reposition patient to alleviate pressure to sacrum. Pain medicine just administered by [facility], RN. Reassessment:  Patient resting with eyes closed. No s/sx of pain observed. Scheduled Decadron administered as ordered. All safety measures in place. Call bell in reach. 2300:  Patient sleeping. All symptoms managed at this time. Wife at bedside. 0004:  Scheduled Reglan IV administered as ordered. 0014:  PRN Benadryl IV administered as ordered for restlessness. Wife reports patient is restless and has been pulling at chest port. Will reassess. 0100:  Reassessment:  Patient resting with eyes closed. Patient appears comfortable. Wife at bedside. 0300:   Patient sleeping. All symptoms managed at this time. Wife at bedside. 0500:  Patient sleeping. All symptoms managed at this time. Wife at bedside. 8412:  Scheduled Decadron and Reglan IV administered as ordered. Patient states no pain. Patient received one PRN dose of Hydromorphone for pain this shift. Report to Catrachita Perdue RN.

## 2022-04-23 NOTE — PROGRESS NOTES
Problem: Pressure Injury - Risk of  Goal: *Prevention of pressure injury  Description: Patient Danielle Clements will remain free from acquired pressure injuries AEB zero acquired pressure injuries during assessment of skin each shift during inpatient hospice stay. Outcome: Progressing Towards Goal  Note: Pressure Injury Interventions:  Sensory Interventions: Assess changes in LOC,Assess need for specialty bed,Float heels,Keep linens dry and wrinkle-free,Minimize linen layers    Moisture Interventions: Absorbent underpads,Apply protective barrier, creams and emollients,Limit adult briefs,Minimize layers,Moisture barrier    Activity Interventions: Assess need for specialty bed    Mobility Interventions: Assess need for specialty bed    Nutrition Interventions: Document food/fluid/supplement intake    Friction and Shear Interventions: Apply protective barrier, creams and emollients,Lift sheet,Minimize layers                Problem: Risk for Falls  Goal: Free of falls during inpatient stay  Description: Feliciano Clements will remain free from falls AEB no injuries r/t falls each shift during inpatient hospice stay. Outcome: Progressing Towards Goal     Problem: Pain  Goal: Assess satisfaction of level of comfort and symptom control  Description: Patient Danielle Clements will exhibit decrease in pain AEB rating pain less than 3 on 1-10 scale or 3 FLACC score within each shift of receiving pain medication during inpatient hospice stay. Outcome: Progressing Towards Goal     Problem: Nausea/Vomiting (Adult)  Goal: *Absence of nausea/vomiting  Description: Feliciano Clements will exhibit decreased or eliminated episodes of Nausea/Vomiting AEB less than 2 PRN antiemetic medication administration each shift during inpatient hospice stay.     Outcome: Progressing Towards Goal     Problem: Infection - Risk of, Central Venous Catheter-Associated Bloodstream Infection  Goal: *Absence of infection signs and symptoms  Description: Patient Ping Clements will remain free from infection R/T Central Venous Line AEB absence of signs and symptoms of infection each shift during inpatient hospice stay. Outcome: Progressing Towards Goal     Problem: Infection - Risk of, Urinary Catheter-Associated Urinary Tract Infection  Goal: *Absence of infection signs and symptoms  Description: Patient Ping Clements will remain free from infection R/T urinary catheter AEB absence of signs and symptoms of infection each shift during inpatient hospice stay.      Outcome: Progressing Towards Goal

## 2022-04-23 NOTE — PROGRESS NOTES
Problem: Pressure Injury - Risk of  Goal: *Prevention of pressure injury  Description: Patient Taylor Clements will remain free from acquired pressure injuries AEB zero acquired pressure injuries during assessment of skin each shift during inpatient hospice stay. Outcome: Progressing Towards Goal  Note: Pressure Injury Interventions:  Sensory Interventions: Assess changes in LOC,Assess need for specialty bed,Float heels,Keep linens dry and wrinkle-free,Minimize linen layers    Moisture Interventions: Absorbent underpads,Apply protective barrier, creams and emollients,Limit adult briefs,Minimize layers,Moisture barrier    Activity Interventions: Assess need for specialty bed    Mobility Interventions: Assess need for specialty bed    Nutrition Interventions: Document food/fluid/supplement intake    Friction and Shear Interventions: Apply protective barrier, creams and emollients,Lift sheet,Minimize layers      Problem: Risk for Falls  Goal: Free of falls during inpatient stay  Description: Feliciano Clements will remain free from falls AEB no injuries r/t falls each shift during inpatient hospice stay. Outcome: Progressing Towards Goal     Problem: Pain  Goal: Assess satisfaction of level of comfort and symptom control  Description: Feliciano Clements will exhibit decrease in pain AEB rating pain less than 3 on 1-10 scale or 3 FLACC score within each shift of receiving pain medication during inpatient hospice stay. Outcome: Progressing Towards Goal     Problem: Nausea/Vomiting (Adult)  Goal: *Absence of nausea/vomiting  Description: Feliciano Clements will exhibit decreased or eliminated episodes of Nausea/Vomiting AEB less than 2 PRN antiemetic medication administration each shift during inpatient hospice stay.     Outcome: Progressing Towards Goal     Problem: Infection - Risk of, Central Venous Catheter-Associated Bloodstream Infection  Goal: *Absence of infection signs and symptoms  Description: Patient Fernando Clements will remain free from infection R/T Central Venous Line AEB absence of signs and symptoms of infection each shift during inpatient hospice stay. Outcome: Progressing Towards Goal     Problem: Infection - Risk of, Urinary Catheter-Associated Urinary Tract Infection  Goal: *Absence of infection signs and symptoms  Description: Feliciano Clements will remain free from infection R/T urinary catheter AEB absence of signs and symptoms of infection each shift during inpatient hospice stay.      Outcome: Progressing Towards Goal

## 2022-04-23 NOTE — PROGRESS NOTES
[address] received from Elisabeth Marte RN. Patient identified by name and . Patient admitted under [facility] level of care with diagnosis of pancreatic cancer for management of pain and nausea/voniting. Patient is lying in bed with eyes closed with no signs or symptoms of pain, anxiety, agitation, dyspnea, or nausea/vomiting. Patient is currently on room air and is not in respiratory distress. Ordonez to gravity drain with yellow urine draining. FLAAC 0/10. Wife remains at his bedside and denies any current needs. Patient repositioned for comfort. Reviewed plan of care with CNA for this shift with understanding voiced. Discharge Plan is to remain at [facility] until he meets his demise. Reassessment of proper LOC will be done on an ongoing basis. Safety measures in place including, door open for continuous monitoring, bed in low locked position, tab alert in place, call light within reach, and side rails up x2. Will continue to monitor for changes, safety, and needs.  Patient continues to rest quietly in bed with eyes closed and respirations even and unlabored. Wife states he has been resting most of the day following the Ativan that he was given prior to placing his ordonez catheter. Patient appears comfortable and does not appear to have any needs at this time. Wife denies any current needs. Safety measures in place including, door open for continuous monitoring, bed in low locked position, tab alert in place, call light within reach, and side rails up x2. Will continue to monitor for changes, safety, and needs. FLACC 0/10.     Patient continues to rest quietly, he does not awaken upon nurse giving him his scheduled IV medications through his port. He does not appear to be in acute distress at this time. His wife remains at his bedside and denies any current needs.  Safety measures in place including, door open for continuous monitoring, bed in low locked position, tab alert in place, call light within reach, and side rails up x2. Will continue to monitor for changes, safety, and needs. FLACC 0/10.    2314 Patient lying in bed awake and speaking to his wife at his bedside. He states he is feeling well, he denies any complaint of nausea/vomiting or pain at this time. He is alert and oriented x 2 and does not appear to be confused at this time. His wife states he has not complained of pain or nausea since he has been awake. Safety measures in place including, door open for continuous monitoring, bed in low locked position, tab alert in place, call light within reach, and side rails up x2. Will continue to monitor for changes, safety, and needs. FLACC 0/10.    0117 Pt observed on rounds, he is resting quietly with eyes closed and respirations even and unlabored. No facial grimacing, moaning, or signs of agitation observed. All symptoms managed at this time. No acute changes noted. FLACC 0/10. Safety measures remain in place, will continue to monitor for changes, safety, and comfort. 8899 Patient wanting to stand up at the side of the bed, assistance x 2 with one being his wife assisted him to stand, his legs are very unsteady and he was unable to stand for longer than 10 seconds. Patient then asked to sit on the bedside commode. He states he may have to have a bowel movement. Patient sat up for several minutes then requested to get back into the bed. Patient needing maximum assistance to get back into bed, he was only able to pivot and sit at the bottom of the bed and be pulled up into the bed by staff. Patient is stating he is having some nausea and belching. Haldol 4mg IVP given per prn order for nausea/vomiting. He denies having any pain at this time and wife states she believes he would report if he was in pain and in need of medications. Wife remains at his bedside providing support.  Safety measures in place including, door open for continuous monitoring, bed in low locked position, tab alert in place, call light within reach, and side rails up x2. Will continue to monitor for changes, safety, and needs. 0301 Patient is lying in bed with eyes closed and respirations even and unlabored. He does not appear to be in acute distress at this time. Wife remains at his bedside and denies any current needs. Safety measures in place including, door open for continuous monitoring, bed in low locked position, tab alert in place, call light within reach, and side rails up x2. Will continue to monitor for changes, safety, and needs. FLACC 0/10.    2389 Patient sitting up on the side of the bed with wife holding him so he will not fall to the side. He states he is in pain but unable to verbalize a number or where it is located. Dilaudid 1mg IVP given per prn order for pain/dyspnea. Patient appears to be having increasing confusion. He is unable to understand the ordonez and why he is in need of having it or exactly how it is working. He does get frustrated with his wife who is trying to explain and comfort him. He is also wanting to get up out of the bed and is getting upset when he is told he is too weak to do so. Encouragement and support provided to his wife as she is having a difficult time with his decline. Safety measures in place including, door open for continuous monitoring, bed in low locked position, tab alert in place, call light within reach, and side rails up x2. Will continue to monitor for changes, safety, and needs. 2105 Patient lying in bed with eyes closed in no distress noted. Respirations remain even and unlabored. Wife remains at his bedside resting on the pull out chair. Safety measures in place including, door open for continuous monitoring, bed in low locked position, tab alert in place, call light within reach, and side rails up x2. Will continue to monitor for changes, safety, and needs.       Report given to oncoming RN

## 2022-04-23 NOTE — H&P
History and Physical    Patient: Clay Clements MRN: [id number]  SSN: [id number]    YOB: 1960  Age: 63 y.o. Sex: male      Subjective:      Clay Dixon is a 53-year-old man with a principal hospice diagnosis of metastatic adenocarcinoma of the pancreas confirmed September 2021 who was referred to hospice care by his oncologist Gabe Bourne MD at [address] [place] [address] after patient demonstrated persistent decline in functional status and upward trend in CA 19-9 antigen from 10,000 to 29,700.  Peritoneal carcinomatosis and malignant ascites is a particularly potent related diagnosis adversely affecting his prognosis.  Mr. Clements underwent right lower quadrant US directed therapeutic 5 L paracentesis 0 4/7/2022 to relieve distention and nausea.  He received a week of oral ciprofloxacin for prophylaxis against bacterial peritonitis. Lauren Greer had minimal improvement in symptoms and became more weak and dizzy.  Bone metastasis involving the left ilium and sacrum with impingement on spinal nerves S1 L5 causing left foot drop and marked dysesthesia poorly responsive to Decadron therapy is also a related diagnosis.  Persistent right mid back pain due to extension of the pancreatic tail primary cancer into the posterior gastric wall, while partialy relieved after celiac block, has caused acceleration of pain and patient is experiencing increasing nausea.        The patient was seen in home by admission RN, on-call RN the following day, myself with case managing RN on 4/20/2022 and on-call RN 4/21/2022. Jeral Jose M to adjust multiagent antinausea therapy and initiate up titration of oxycodone  IR to treat dry heaves and pain proved ineffective even though the patient was able to achieve modest catharsis.  He is unable to bear full weight on the left leg and movements in his own home is becoming increasingly paralyzed. Lauren Greer has not left his own room or 10 days.            He is transferred to [facility] for 21 2022 because he is unable to ingest oral steroid or opioid therapy for pain management due to uncontrolled retching.  His life expectancy in a bedbound state with peritoneal carcinomatosis is less than 6 weeks.  Intent on [facility] admission is to achieve symptom control with parenteral opioid and antiemetic therapy.  Subsequently he will be progressed to oral regimen for nausea and pain control which will allow the patient to return to his home for continued hospice care. .    Multiple metastases to the liver and lungs showed radiologic response to FOLFIRINOX treatment September through February 2022. Past Medical History:   Diagnosis Date    A-fib [facility])     Anxiety     Cancer, pancreas, body ([address] 09/01/2021    Depression     HTN (hypertension)     Hyperlipidemia     Hyperuricemia      Past Surgical History:   Procedure Laterality Date    IR INSERT TUNL CVC W PORT OVER 5 YEARS Right 09/01/2021      Family History   Problem Relation Age of Onset    Cancer Mother     Pancreatic Cancer Sister     Colon Cancer Neg Hx     Prostate Cancer Neg Hx      Social History     Tobacco Use    Smoking status: Current Some Day Smoker     Packs/day: 0.25     Types: Cigars    Smokeless tobacco: Never Used   Substance Use Topics    Alcohol use: No      Prior to Admission medications    Medication Sig Start Date End Date Taking? Authorizing Provider   Movantik 25 mg tab tablet Take 1 Tablet by mouth two (2) times daily as needed. 4/7/22  Yes Provider, Historical   ondansetron hcl (ZOFRAN) 8 mg tablet Take 8 mg by mouth three (3) times daily as needed. 10/3/21  Yes Provider, Historical   pantoprazole (PROTONIX) 40 mg tablet Take 40 mg by mouth two (2) times a day. Indications: indigestion, gastroesophageal reflux disease 4/15/22  Yes Minnie Luis NP   acetaminophen (TYLENOL) 500 mg tablet Take 500 mg by mouth every four (4) hours as needed (mild pain or fever).  Administer one tablet (=500mg) by mouth every 4 hours as needed for mild pain or fever   Indications: mild pain and/or fever 4/15/22  Yes Angie Taveras, ROSEANNA   oxyCODONE IR (ROXICODONE) 5 mg immediate release tablet Take 10 mg by mouth every three (3) hours as needed for Pain. place oxycodone tab between cheek and teeth per md  Indications: severe pain 4/20/22  Yes Angie Taveras, ROSEANNA   sennosides-docusate sodium (Senna Plus) 8.6-50 mg cap Take 2 Tablets by mouth two (2) times a day. Administer two tablets (=17.2-100mg) by mouth two times daily. Hold for greater than 2 bowel movements in 24 hours. Indications: constipation 4/15/22  Yes Angie Taveras NP   dexAMETHasone (DECADRON) 4 mg tablet Take 4 mg by mouth daily. Indications: Cancer Related Pain 4/15/22  Yes Angie Taveras NP   insulin degludec Shahnaz Ruffing FlexTouch U-100) 100 unit/mL (3 mL) inpn 10 Units by SubCUTAneous route daily. Administer 10 units subcutaneously once daily. Hold for BS < 100 and/or NPO status. Indications: type 2 diabetes mellitus 4/15/22  Yes Angie Taveras NP   sodium chloride (Normal Saline Flush) 10 mL by IntraCATHeter route every month. PORTACATH R Upper Chest:  Hospice RN to Flush with 10cc NS followed by 300 units of Heparin intracatheter once monthly. Indications: maintain patency of indwelling intravenous catheter 4/15/22  Yes Angie Taveras NP   heparin, porcine, pf (heparin LockFlush,Porcine,,PF,) 100 unit/mL injection 300 Units by IntraCATHeter route every month. PORTACATH R Upper Chest :  Hospice RN to Flush with 10cc NS followed by 300 units of Heparin intracatheter once monthly. Indications: prevent clot from blocking an intravenous catheter 4/15/22  Yes Angie Taveras NP   promethazine (PHENERGAN) 25 mg suppository Insert 25 mg into rectum every six (6) hours as needed for Nausea. 4/20/22   Provider, Historical   fentaNYL (DURAGESIC) 25 mcg/hr PATCH 1 Patch by TransDERmal route every seventy-two (72) hours.  Indications: severe chronic pain with opioid tolerance 4/20/22   Provider, Historical   ondansetron (ZOFRAN ODT) 8 mg disintegrating tablet Take 8 mg by mouth every eight (8) hours as needed for Nausea or Vomiting or Vomiting. Indications: nausea or vomiting 4/20/22   Provider, Historical   oxyCODONE IR (ROXICODONE) 5 mg immediate release tablet Take 10 mg by mouth nightly. Administer two tablets (=10mg) by mouth every bedtime   Indications: severe pain disrupting sleep  4/20/22   Mortimer Milan, NP        No Known Allergies    Review of Systems:  See [facility]    Objective:     Vitals:    04/21/22 1829 04/22/22 0600 04/22/22 1747 04/23/22 1653   BP: 92/72 (!) 77/61 (!) 85/63 (!) 97/56   Pulse: (!) 112 (!) 125 (!) 106 (!) 107   Resp: 16 16 17 18   Temp: 98 °F (36.7 °C) 98.2 °F (36.8 °C) (!) 96.7 °F (35.9 °C) (!) 96.5 °F (35.8 °C)        Physical Exam:  Very ill-appearing with marked ascites. He is drowsy but arouses to speech. He is oriented to his surroundings. Neck veins are flat. There is no cervical or supraclavicular adenopathy. Dentition in good repair oropharynx dry. Sclera are nonicteric. Pupils narrow extraocular motion normal the patient does evidence some myoclonic jerking of the upper extremities. Lower extremities are hyperreflexic. Plantar reflex is upgoing bilaterally. He has dullness to percussion in the lower one third of the right lung base. There are no rales or rhonchi evident. Heart rhythm is regular there is a soft systolic ejection murmur over the aortic outflow tract. Radial and brachial pulses are faint. Urine is bilious and scant. Output less than 250mL/day. Abdomen grossly distended some gaseous hypertympany is central but fluid wave is easily demonstrable. Moderate tenderness to palpation over the liver and spleen without palpable hepatosplenomegaly. He has scrotal edema. He has anasarca. There is left foot drop. Light touch is essentially absent below both knees.   Patient is not actively vomiting but is proposed by the notion of eating any food. Assessment:     Hospital Problems  Date Reviewed: 4/20/2022          Codes Class Noted POA    * (Principal) Refractory nausea and vomiting ICD-10-CM: R11.2  ICD-9-CM: 787.01 Acute 4/20/2022 Yes        Malignant ascites ICD-10-CM: R18.0  ICD-9-CM: 789.51 End Stage 4/20/2022 Yes        Abdominal pain ICD-10-CM: R10.9  ICD-9-CM: 789.00 Acute 4/20/2022 Yes        Malignant neoplasm of body of pancreas ([facility] ICD-10-CM: C25.1  ICD-9-CM: 157.1 End Stage 4/20/2022 Yes        Per[facility] ICD-10-CM: C48.2  ICD-9-CM: 158.9 Acute 4/20/2022 Yes        Peripheral neuropathy due to and not concurrent with chemotherapy [facility]) ICD-10-CM: G62.0, T45.1X5S  ICD-9-CM: 357.6, 909.5 Chronic 4/20/2022 Yes        Malignant cachexia ([facility] ICD-10-CM: R64  ICD-9-CM: 799.4 Chronic 4/20/2022 Yes        Physical debility ICD-10-CM: R53.81  ICD-9-CM: 799.3 Acute 4/20/2022 Yes        Cancer, pancreas, body ([facility] ICD-10-CM: C25.1  ICD-9-CM: 157.1  4/20/2022 Yes        Palpitations ICD-10-CM: R00.2  ICD-9-CM: 785.1  5/24/2017 Yes    Overview Signed 6/29/2017  1:29 PM by Joaquin Byrnes MD     Check TSH.                    Plan:     Current Facility-Administered Medications   Medication Dose Route Frequency    haloperidol lactate (HALDOL) injection 4 mg  4 mg IntraVENous Q8H    Or    haloperidol lactate (HALDOL) injection 4 mg  4 mg IntraMUSCular Q8H    [Held by provider] senna (SENOKOT) tablet 17.2 mg  2 Tablet Oral BID    sodium chloride (NS) flush 3 mL  3 mL IntraVENous Q8H    [Held by provider] pantoprazole (PROTONIX) tablet 40 mg  40 mg Oral ACB&D    sodium chloride (NS) flush 10 mL  10 mL InterCATHeter PRN    HYDROmorphone (DILAUDID) injection 1 mg  1 mg IntraVENous Q30MIN PRN    Or    HYDROmorphone (DILAUDID) injection 1 mg  1 mg SubCUTAneous Q30MIN PRN    haloperidol lactate (HALDOL) injection 4 mg  4 mg IntraVENous Q1H PRN    Or    haloperidol lactate (HALDOL) injection 4 mg 4 mg SubCUTAneous Q1H PRN    bisacodyL (DULCOLAX) suppository 10 mg  10 mg Rectal PRN    alum-mag hydroxide-simeth (MYLANTA) oral suspension 30 mL  30 mL Oral QID PRN    fentaNYL (DURAGESIC) 25 mcg/hr patch 1 Patch  1 Patch TransDERmal Q72H    dexamethasone (DECADRON) 4 mg/mL injection 4 mg  4 mg IntraVENous Q8H    hyoscyamine (LEVSIN) 0.125mg/mL solution 250 mcg (Patient Supplied)  250 mcg Oral Q4H PRN    ondansetron (ZOFRAN) injection 4 mg  4 mg IntraVENous Q4H PRN     Impression and Plan:  He is transferred to [facility] for 21 2022 because he is unable to ingest oral steroid or opioid therapy for pain management due to uncontrolled retching.  His life expectancy in a bedbound state with peritoneal carcinomatosis is less than 6 weeks.  Intent on [facility] admission is to achieve symptom control with parenteral opioid and antiemetic therapy.  Subsequently he will be progressed to oral regimen for nausea and pain control which will allow the patient to return to his home for continued hospice care.        Signed By: Pavithra Stokes MD     April 23, 2022

## 2022-04-23 NOTE — PROGRESS NOTES
0745- patient waking up. Alert and pleasant. Pulled up in bed with assistance from spouse, Mike Files. Mottling to BUE and BLE. Patient with ascites and swelling to BLE. Fontana draining erwin urine. Bed is low and locked, tab alert in place, and door left open for continuous monitoring. Patient is currently under GIP level of care appropriately, and expected to pass at [facility]. Will continue to assess need for change in LOC / discharge and collaborate with IDG team accordingly. Comfort bag provided and blue book provided for spouse. 0900- patient sat up in bed to take a sip of water. Gurgling after intake. Patient winces with intake as if it is painful. Settles after intake. No needs noted at this time. Pt friend at bedside to visit. Patient is in and out of lethargic states. Holding medication at this time. Dr. Scarlett Roth aware. 1122- patient lethargic, in and out of sleep. No needs noted at this time. Dr. Scarlett Roth has made his rounding and family updated on plan of care. 1253- patient awake and in good spirits. Newly scheduled haldol administered. Wife at bedside educated on medication. Fentanyl patch applied to L arm and previous patch wasted with Ki Matthews RN. Patient denies nausea or pain at this time. Pulled up in bed with assistance from spouse for comfort. Tab alert in place. 1420- pt spouse on call light. Patient sitting on side of bed wanting to get up to urinate. Reminded him that he has a catheter and removed catheter from statlock so he could watch it drain. Exercised patient's legs so he could feel movement. Washed and lotioned his back and washed his hair. Patient states that he feels better. Pt changed in to his underwear with assistance from spouse. Pulled up in bed, cream applied to sacrum for protection and pillow placed under R hip. Patient more relaxed at this time, but staring off and hallucinating, seeing ants. Hallucinations not causing anxiety or agitation.  Bed is low and locked, spouse and new visitors at bedside. 1517- scheduled decadron administered IV. Patient drowsy. No needs noted at this time. Symptoms managed. 1646- pt spouse on call light. Patient sitting on side of bed and wanting to get up. He says he has to urinate. Fontana drained small amount and patient felt relief. Reminded him that he is not allowed to get out of bed. Pt also complaining of pain. Prn dilaudid and prn haldol administered for symptoms management. flacc 8/10.    1722- patient resting quietly with eyes closed. No s/sx of pain or agitation. flacc 0/10. Tab alert in place.      Report given to [facility]

## 2022-04-23 NOTE — PROGRESS NOTES
Progress Note    Patient: Taylor Clements MRN: [id number]  SSN: [id number]    YOB: 1960  Age: 63 y.o. Sex: male      Admit Date: 4/20/2022    LOS: 3 days     Subjective:     I saw Mr. Deadra Gitelman on morning rounds. His wife is at bedside. Review of Systems:  The patient reports significant improvement in his back and epigastric pain. There have been no significant retching episodes per his wife. Nurse reports that the patient has insufficient strength to allow transfer from bed to bedside commode without assistance. Objective:     Vitals:    04/21/22 1829 04/22/22 0600 04/22/22 1747 04/23/22 1653   BP: 92/72 (!) 77/61 (!) 85/63 (!) 97/56   Pulse: (!) 112 (!) 125 (!) 106 (!) 107   Resp: 16 16 17 18   Temp: 98 °F (36.7 °C) 98.2 °F (36.8 °C) (!) 96.7 °F (35.9 °C) (!) 96.5 °F (35.8 °C)        Intake and Output:  Current Shift: 04/23 0701 - 04/23 1900  In: -   Out: 200 [Urine:200]  Last three shifts: No intake/output data recorded. Physical Exam:   Note clinical hypotension 4/22/2022 and tachycardia has improved slightly, but the patient's body temperature is falling. He exhibits some patellar and ankle mottling. Abdomen is nontender. Bowel sounds are absent. Physical examination otherwise unchanged from [address] Lab/Data Review:  . None    Assessment:     Principal Problem:    Refractory nausea and vomiting (4/20/2022)    Active Problems:    Malignant ascites (4/20/2022)      Abdominal pain (4/20/2022)      Malignant neoplasm of body of pancreas ([address] (4/20/2022)      Peritoneal carcinoma ([address] (4/20/2022)      Peripheral neuropathy due to and not concurrent with chemotherapy ([address] (4/20/2022)      Malignant cachexia ([address] (4/20/2022)      Physical debility (4/20/2022)      Palpitations (5/24/2017)      Overview: Check TSH.       [facility] (4/20/2022)        Plan:     Current Facility-Administered Medications   Medication Dose Route Frequency    haloperidol lactate (HALDOL) injection 4 mg  4 mg IntraVENous Q8H    Or    haloperidol lactate (HALDOL) injection 4 mg  4 mg IntraMUSCular Q8H    [Held by provider] senna (SENOKOT) tablet 17.2 mg  2 Tablet Oral BID    sodium chloride (NS) flush 3 mL  3 mL IntraVENous Q8H    [Held by provider] pantoprazole (PROTONIX) tablet 40 mg  40 mg Oral ACB&D    sodium chloride (NS) flush 10 mL  10 mL InterCATHeter PRN    HYDROmorphone (DILAUDID) injection 1 mg  1 mg IntraVENous Q30MIN PRN    Or    HYDROmorphone (DILAUDID) injection 1 mg  1 mg SubCUTAneous Q30MIN PRN    haloperidol lactate (HALDOL) injection 4 mg  4 mg IntraVENous Q1H PRN    Or    haloperidol lactate (HALDOL) injection 4 mg  4 mg SubCUTAneous Q1H PRN    bisacodyL (DULCOLAX) suppository 10 mg  10 mg Rectal PRN    alum-mag hydroxide-simeth (MYLANTA) oral suspension 30 mL  30 mL Oral QID PRN    fentaNYL (DURAGESIC) 25 mcg/hr patch 1 Patch  1 Patch TransDERmal Q72H    dexamethasone (DECADRON) 4 mg/mL injection 4 mg  4 mg IntraVENous Q8H    hyoscyamine (LEVSIN) 0.125mg/mL solution 250 mcg (Patient Supplied)  250 mcg Oral Q4H PRN    ondansetron (ZOFRAN) injection 4 mg  4 mg IntraVENous Q4H PRN     The patient's inability to comfortably swallow oral medications has halted our intent to convert his symptom control regimen to an oral route. Reglan associated side effect of  Akathisia responded well to diphenhydramine but the diphenhydramine has caused urinary retention. We will stop Reglan and use Haldol as the principal antiemetic given IV 4 mg every 6 scheduled. We will also use Haldol IV should the patient's visual hallucination and become distressing to him. We will continue to use lorazepam for restlessness and nausea. Analgesic effect of sustained topical fentanyl and as needed hydromorphone has controlled acute distress and breakthrough pain. For the present time we will provide dexamethasone via parenteral route and hold Protonix due to odynophagia.   The patient's wife has inquired about plans to convert the patient from GIP to routine care. This is not advisable at present. Privately expressed to his wife my increasing certainty that Clayton's death is likely to occur in the next 72 hours. Hypotension and oliguria are particularly ominous. Fortunately the patient maintains good spirits despite his physical discomfort.     Signed By: John Kern MD     April 23, 2022

## 2022-04-24 NOTE — PROGRESS NOTES
5159- Received report from [place], [facility]. Patient is currently under GIP level of care for Refractory nausea and vomiting with a diagnosis of pancreatic cancer admitted 4/20/2022. . Will continue to assess need for change in LOC / discharge and collaborate with IDG team accordingly. 8579- Visualized the patient. Pt in bed low, locked position. Side rails x 2 . Tab alarm in place, call light within reach. Pt is calm, resting/sleeping, pt responds to voice. No grimacing, no moaning, no agitation noted. FLACC-0,  Door left open for safety. Pts wife is at bedside. 7471 - Pt laying in bed. Wife at bedside. Pt arouses with soft touch and voice. Some words are appropriate, some words incomprehensible. Pt states that he does not have any pain at this time. Pts port has good blood returned and flushed without resistance. Pt reports that the catheter placement hawkins is \"sticking\" in his leg. Wife requests to change locations. We also discuss no bowel movement since 4/11, suppository placement on 4/22 with no results. Wife and patient agree that they are ok with no stimulants at this time for a BM.     0911- Pts wife came to the nurse's station and reports that the patients left elbow is bleeding. She believes that he may have scratched it with his fingernail. All bleeding controlled at this time. Pt has a superficial scratch on left elbow. 2\" in length. Non-adhesive dressing placed with jaleel. Sheets changed at this time. 26- Pts wife at bedside, both patient and spouse and resting comfortably at this time. Pt does not respond to voice or soft touch at this time. Respirations even, non-labored. FLACC-0. No grimacing, no moaning/groaning, no restlessness. No needs at this time. 1148- Routine medication administered. Audible gurgling, pts breathing is non-labored and does not appear to be in distress. Pt nods yes and no at this time but is non-verbal. Pt denies pain at this time.  Pt gives thumbs up that he is in a position of comfort at this time and does not want to be repositioned. Pts wife states that she would prefer that he not have a bath today. Expressed to her that I would like to visualize his sacrum during this shift for wound prevention. She is to be bringing in a larger pair of boxer briefs and will do it at that time. 1330- pt resting in bed with wife and another visitor at bedside. FLACC-0. No grimacing,moaning/groaning, restlessness, agitation, or distress noted at this time. Routine medications administered. No needs at this time. 1518- Pt resting/sleeping in bed. FLACC-0 No grimacing, no moaning, no groaning, no restlessness. Respirations even, non-labored. Wife at bedside. States no needs at this time. 1715- Pt resting in bed with eyes open, resting with spouse and brother at bedside. Pt states that the urinary catheter placement hawkins is no longer bothering him and does not need the location changed. With CNA assistance pt log rolled on to left side with visualization of sacrum. No redness, tenderness, or breaks in the skin. Area is dry, blanchable and and intact. Pt did not want any powder placed on back or buttocks. Pt repositioned at this time on right side with pillows placed under the left side. Pillows placed under both legs. After repositioning the patient he states that he is having left side rib cage pain, guarding left side rib cage, and grimacing. Pt given PRN Dilaudid 1mg for pain rated 5/10. This is the first time that the patient has had pain after repositioning. Discussion with wife and patient to be medicated for pain prior to moving for next time. 1830-Wife at bedside, no needs at this time. Pt in bed low, locked position. Side rails x 2 . Tab alarm in place, call light within reach. Pt report given to JED KILGORE.

## 2022-04-24 NOTE — PROGRESS NOTES
Problem: Pressure Injury - Risk of  Goal: *Prevention of pressure injury  Description: Feliciano Clements will remain free from acquired pressure injuries AEB zero acquired pressure injuries during assessment of skin each shift during inpatient hospice stay. Outcome: Progressing Towards Goal  Note: Pressure Injury Interventions:  Sensory Interventions: Assess changes in LOC,Avoid rigorous massage over bony prominences,Check visual cues for pain,Float heels,Minimize linen layers    Moisture Interventions: Absorbent underpads,Apply protective barrier, creams and emollients,Limit adult briefs,Minimize layers,Moisture barrier    Activity Interventions: Assess need for specialty bed    Mobility Interventions: Assess need for specialty bed,Float heels    Nutrition Interventions: Document food/fluid/supplement intake    Friction and Shear Interventions: Apply protective barrier, creams and emollients,Lift sheet,Minimize layers                Problem: Risk for Falls  Goal: Free of falls during inpatient stay  Description: Feliciano Clements will remain free from falls AEB no injuries r/t falls each shift during inpatient hospice stay. Outcome: Progressing Towards Goal     Problem: Pain  Goal: Assess satisfaction of level of comfort and symptom control  Description: Feliciano Clements will exhibit decrease in pain AEB rating pain less than 3 on 1-10 scale or 3 FLACC score within each shift of receiving pain medication during inpatient hospice stay. Outcome: Progressing Towards Goal     Problem: Anticipatory Grief  Goal: Explore reactions to and verbalize acceptance of impending loss  Description: Patient/family/caregiver will explore reactions to and verbalize acceptance of impending loss. Outcome: Progressing Towards Goal     Problem: Nausea/Vomiting (Adult)  Goal: *Absence of nausea/vomiting  Description: Patient Warren Clements will exhibit decreased or eliminated episodes of Nausea/Vomiting AEB less than 2 PRN antiemetic medication administration each shift during inpatient hospice stay. Outcome: Progressing Towards Goal     Problem: Infection - Risk of, Central Venous Catheter-Associated Bloodstream Infection  Goal: *Absence of infection signs and symptoms  Description: Patient Peggy Clements will remain free from infection R/T Central Venous Line AEB absence of signs and symptoms of infection each shift during inpatient hospice stay. Outcome: Progressing Towards Goal     Problem: End of Life Process  Goal: Demonstrate understanding of end of life processes  Description:   Patient Peggy Clements family/ caregiver will verbalize recall or return demonstration of emotional support and stress-reduction strategies and appropriate grieving on admission and ongoing as needed by the IDG members throughout the dying process during inpatient hospice stay. Outcome: Progressing Towards Goal     Problem: Discharge Planning  Goal: *Participates in discharge planning  Description: Feliciano Clements Pt, and pt's family/ care giver will receive support from community resources through the support of SW such as SNF placements, SW calling facilities for the family, updating the family/care giver on facilities, and sending out referrals for pt and family/ care giver. Outcome: Progressing Towards Goal     Problem: Infection - Risk of, Urinary Catheter-Associated Urinary Tract Infection  Goal: *Absence of infection signs and symptoms  Description: Feliciano Clements will remain free from infection R/T urinary catheter AEB absence of signs and symptoms of infection each shift during inpatient hospice stay.      Outcome: Progressing Towards Goal     Problem: Patient Education: Go to Patient Education Activity  Goal: Patient/Family Education  Outcome: Resolved/Met

## 2022-04-24 NOTE — PROGRESS NOTES
Problem: Pressure Injury - Risk of  Goal: *Prevention of pressure injury  Description: Feliciano Clements will remain free from acquired pressure injuries AEB zero acquired pressure injuries during assessment of skin each shift during inpatient hospice stay. Outcome: Progressing Towards Goal  Note: Pressure Injury Interventions:  Sensory Interventions: Assess changes in LOC,Avoid rigorous massage over bony prominences,Check visual cues for pain,Float heels,Minimize linen layers    Moisture Interventions: Absorbent underpads,Apply protective barrier, creams and emollients,Limit adult briefs,Minimize layers,Moisture barrier    Activity Interventions: Assess need for specialty bed    Mobility Interventions: Assess need for specialty bed,Float heels    Nutrition Interventions: Document food/fluid/supplement intake    Friction and Shear Interventions: Apply protective barrier, creams and emollients,Lift sheet,Minimize layers                Problem: Risk for Falls  Goal: Free of falls during inpatient stay  Description: Feliciano Clements will remain free from falls AEB no injuries r/t falls each shift during inpatient hospice stay. Outcome: Progressing Towards Goal     Problem: Pain  Goal: Assess satisfaction of level of comfort and symptom control  Description: Feliciano Clements will exhibit decrease in pain AEB rating pain less than 3 on 1-10 scale or 3 FLACC score within each shift of receiving pain medication during inpatient hospice stay. Outcome: Progressing Towards Goal     Problem: Anticipatory Grief  Goal: Explore reactions to and verbalize acceptance of impending loss  Description: Patient/family/caregiver will explore reactions to and verbalize acceptance of impending loss. Outcome: Progressing Towards Goal     Problem: Nausea/Vomiting (Adult)  Goal: *Absence of nausea/vomiting  Description: Patient Warren Clements will exhibit decreased or eliminated episodes of Nausea/Vomiting AEB less than 2 PRN antiemetic medication administration each shift during inpatient hospice stay. Outcome: Progressing Towards Goal     Problem: Infection - Risk of, Central Venous Catheter-Associated Bloodstream Infection  Goal: *Absence of infection signs and symptoms  Description: Feliciano Clements will remain free from infection R/T Central Venous Line AEB absence of signs and symptoms of infection each shift during inpatient hospice stay. Outcome: Progressing Towards Goal     Problem: End of Life Process  Goal: Demonstrate understanding of end of life processes  Description:   Feliciano Clements family/ caregiver will verbalize recall or return demonstration of emotional support and stress-reduction strategies and appropriate grieving on admission and ongoing as needed by the IDG members throughout the dying process during inpatient hospice stay. Outcome: Progressing Towards Goal     Problem: Discharge Planning  Goal: *Participates in discharge planning  Description: Feliciano Clements Pt, and pt's family/ care giver will receive support from community resources through the support of SW such as SNF placements, SW calling facilities for the family, updating the family/care giver on facilities, and sending out referrals for pt and family/ care giver. Outcome: Progressing Towards Goal     Problem: Falls - Risk of  Goal: *Absence of Falls  Description: Feliciano Clements will remain free from falls AEB no injuries r/t falls each shift during inpatient hospice stay.     Outcome: Progressing Towards Goal  Note: Fall Risk Interventions:  Mobility Interventions: Bed/chair exit alarm    Mentation Interventions: Adequate sleep, hydration, pain control,Bed/chair exit alarm,Door open when patient unattended,Evaluate medications/consider consulting pharmacy    Medication Interventions: Bed/chair exit alarm,Evaluate medications/consider consulting pharmacy    Elimination Interventions: Bed/chair exit alarm,Call light in reach              Problem: Patient Education: Go to Patient Education Activity  Goal: Patient/Family Education  Outcome: Progressing Towards Goal     Problem: Emotional Support Needs  Goal: Patient/family is receiving emotional support  Description: Pt, pt's spouse Katelynn Waterman, pt's brother Joseph Worthington, and pt's family/friends will receive emotional support from SW weekly through weekly check-ins, education on the hospice philosophy, active listening, rapport building, and validation of feelings throughout pt's hospice journey as evidenced by their voiced understanding of LOC changes and room and board charges, lack of heightened emotional responses, and acknowledgement of SW services available. Outcome: Progressing Towards Goal     Problem: Spiritual Evaluation  Goal: Identify beliefs/practices that support hospice experience  Description: Goal:  Katelynn Waterman and family will verbalize experiencing peace and calmness about death and the afterlife based on their beliefs/ cain tradition. Calmness evidenced by normal respirations and lack of reported anxiety and an ability to communicate hope during Clayton's time with [facility]. Outcome: Progressing Towards Goal     Problem: Anticipatory Grief  Goal: Grief heard and acknowledged, anxiety reduced, patient coping identified, patient/family expressed gratitude  Description: GOAL: Katelynn Waterman and Joseph Worthington ( brother)   will demonstrate appropriate anticipatory grief reactions related to Clayton's  impending death as evidenced by  their ability to verbalize feelings of denial, bargaining, anger, and depression, display feelings and associated behaviors in an acceptable manner during inpatient hospice stay. Outcome: Progressing Towards Goal     Problem: Infection - Risk of, Urinary Catheter-Associated Urinary Tract Infection  Goal: *Absence of infection signs and symptoms  Description: Patient Danielle Clements will remain free from infection R/T urinary catheter AEB absence of signs and symptoms of infection each shift during inpatient hospice stay.      Outcome: Progressing Towards Goal

## 2022-04-24 NOTE — PROGRESS NOTES
1910: Patient report from off-going shift RN. Patient admitted for GIP LOC for hospice diagnosis of pancreatic cancer to manage pain, nausea, and vomiting. Patient in bed with wife and visitor at bedside. Discussed plan of care with spouse and patient. No further questions or concerns verbalized. Bed in low position and locked, 2 side rails up and tag alarm intact and functioning. All symptoms managed at this time. Discharge plan is for patient to remain in [facility] until passing. Discharge plans to be evaluated for potential LOC change. RN reviewed POC with CNA. 2105: Scheduled med pass performed. Pt is still drowsy. Spouse at bedside. No acute change in status. Pt tolerated well. Will continue to monitor. 2300: No acute change in status. All symptoms managed at this time. 0050: Spouse on call bell. CNA and RN in room to assist pt with voiding. [place] removed from lock to allow pt to see urine flowing into bag. Assisted pt back into bed. Pt tolerated well. Pt denies any need for pain meds at this time. Will continue to monitor. 0205: No acute change in status. All symptoms managed at this time. Will  Continue to monitor. 0500: During scheduled med pass pt c/o left sided rib cage pain. 7/10. Scheduled meds given and PRN dose of dilaudid 1mg IV given for pain. Will continue to monitor for pain resolution and any further needs. 0645: Report given to oncoming shift.

## 2022-04-25 NOTE — [facility] IDG MASTER NOTE
Hosp[facility] Collabor[facility]  Date: 04/25/22  Time: 10:56 AM    ___________________    Patient: Zeny Clements  Coverage Information:     Payor: JOAN     Plan: BSHSI JOAN MASSEY     Subscriber ID: [id number]     Phone Number:   MRN: [id number]    Current Benefit Period: Benefit Period 1  Start Date: 4/15/2022  End Date: 7/13/2022        Hospice Attending Provider: Jensen Robles [address] [place] [address]  [id number]  Phone: [phone]  Fax: [phone]    Level of Care: General Inpatient Care      ___________________    Diagnoses: The primary encounter diagnosis was Refractory nausea and vomiting. Diagnoses of Malignant ascites, Pain of upper abdomen, Peritoneal carcinoma ([facility] Metastatic adenocarcinoma to pancreas [facility]), [facility] Peripheral neuropathy due to and not concurrent with chemotherapy [facility]), Palpitations, Hepatic metastases ([facility] Physical debility, and Metastasis to bone [facility]) were also pertinent to this visit.     Current Medications:    Current Facility-Administered Medications:     carboxymethylcellulose sodium (REFRESH LIQUIGEL) 1 % ophthalmic solution 1 Drop, 1 Drop, Both Eyes, PRN, Susanna Raet, NP    sodium chloride (NS) flush 10 mL, 10 mL, IntraVENous, Q8H, Ayla Andrew NP    fentaNYL (DURAGESIC) 25 mcg/hr patch 1 Patch, 1 Patch, TransDERmal, Q72H **AND** fentaNYL (DURAGESIC) 12 mcg/hr patch 1 Patch, 1 Patch, TransDERmal, Q72H, Susanna Raet, NP    haloperidol lactate (HALDOL) injection 4 mg, 4 mg, IntraVENous, Q8H, 4 mg at 04/25/22 0345 **OR** haloperidol lactate (HALDOL) injection 4 mg, 4 mg, IntraMUSCular, Q8H, Jensen Robles MD    haloperidol lactate (HALDOL) injection 4 mg, 4 mg, IntraVENous, Q4H PRN, Jensen Robles MD    sodium chloride (NS) flush 10 mL, 10 mL, InterCATHeter, PRN, Jensen MD Travis, 10 mL at 04/25/22 7923    HYDROmorphone (DILAUDID) injection 1 mg, 1 mg, IntraVENous, Q30MIN PRN, 1 mg at 04/25/22 1488 **OR** HYDROmorphone (DILAUDID) injection 1 mg, 1 mg, SubCUTAneous, Q30MIN PRN, Gina Jurado MD    haloperidol lactate (HALDOL) injection 4 mg, 4 mg, IntraVENous, Q1H PRN, 4 mg at 04/23/22 1646 **OR** haloperidol lactate (HALDOL) injection 4 mg, 4 mg, SubCUTAneous, Q1H PRN, Gina Jurado MD    bisacodyL (DULCOLAX) suppository 10 mg, 10 mg, Rectal, PRN, Gina Jurado MD    dexamethasone (DECADRON) 4 mg/mL injection 4 mg, 4 mg, IntraVENous, Q8H, Gina Jurado MD, 4 mg at 04/25/22 0554    ondansetron [facility]) injection 4 mg, 4 mg, IntraVENous, Q4H PRN, Gina Jurado MD, 4 mg at 04/20/22 1826    Orders:  Orders Placed This Encounter    DIET PLEASURE     hold     Standing Status:   Standing     Number of Occurrences:   1    VITAL SIGNS     Standing Status:   Standing     Number of Occurrences:   1    NURSING-MISCELLANEOUS: Comfort Care Measures CONTINUOUS     Standing Status:   Standing     Number of Occurrences:   1     Order Specific Question:   Description of Order:     Answer:   Comfort Care Measures    BLADDER CHECKS     May scan bladder PRN for urinary retention and or patient discomfort     Standing Status:   Standing     Number of Occurrences:   74831    PAIN ASSESSMENT Pain and Symptoms: Assess ever 4 hours and PRN, for GIP level of care. PRN Routine     Standing Status:   Standing     Number of Occurrences:   [id number]     Order Specific Question:   Please describe the test or procedure you would like to order. Answer:   Pain and Symptoms: Assess ever 4 hours and PRN, for GIP level of care.  DRESSING CHANGE - PICC, MLC, SUBCUTANEOUS AND ACCESSED PORT DRESSING CHANGE     PICC, MLC, SUBCUTANEOUS AND ACCESSED PORT DRESSING CHANGE:  Change catheter site dressing every 7 days and prn if site dressing becomes damp loosened or visibly soiled.      Standing Status:   Standing     Number of Occurrences:   [facility] W/ BEDSIDE COMMODE     Standing Status:   Standing     Number of Occurrences:   1    NURSING-MISCELLANEOUS: please leave 25 mcg /hr pattch in place and document time of placement as 9:00 AM today CONTINUOUS     Standing Status:   Standing     Number of Occurrences:   1     Order Specific Question:   Description of Order:     Answer:   please leave 25 mcg /hr pattch in place and document time of placement as 9:00 AM today    NURSING-MISCELLANEOUS: patient may have 1 teaspoon ice chips every 30 min as neeeded for dry mouth or thirst CONTINUOUS     Standing Status:   Standing     Number of Occurrences:   1     Order Specific Question:   Description of Order:     Answer:   patient may have 1 teaspoon ice chips every 30 min as neeeded for dry mouth or thirst    NURSING ASSESSMENT:  SPECIFY Assess for GIP, routine, or respite level of care. a 65-year-old man with a principal hospice diagnosis of metastatic adenocarcinoma of the pancreas confirmed September 2021 who was referred to hospice care by his onco...     a 65-year-old man with a principal hospice diagnosis of metastatic adenocarcinoma of the pancreas confirmed September 2021 who was referred to hospice care by his oncologist Savannah Buerger, MD at [address] [place] [address] after patient demonstrated persistent decline in functional status and upward trend in CA 19-9 antigen to 29,700.  Peritoneal carcinomatosis and malignant ascites is a particularly potent related diagnosis adversely affecting his prognosis.  Mr. Clements underwent right lower quadrant US directed therapeutic 5 L paracentesis 0 4/7/2022 to relieve distention and nausea.  He received a week of oral ciprofloxacin for prophylaxis against bacterial peritonitis. Ofe Fraire had minimal improvement in symptoms and became more weak and dizzy.  Bone metastasis involving the left ilium and sacrum with impingement on spinal nerves S1 L5 causing left foot drop and marked dysesthesia poorly responsive to Decadron therapy is also a related diagnosis.  Persistent right mid back pain due to extension of the pancreatic tail primary cancer into the posterior gastric wall with partial relief from celiac block has caused acceleration of pain and patient is experiencing increasing nausea.  Multiple metastases to the liver and lungs showed radiologic response to FOLFIRINOX treatment September through February 2022.  The patient was seen in home by admission RN, on-call RN the following day, myself with case managing RN on 4/20/2022 and on-call RN 4/21/2022. Lux Garcia to adjust multiagent antinausea therapy and initiate up titration of Moxi, known IR to treat dry heaves and pain proved ineffective even though the patient was able to achieve modest catharsis.  He is unable to bear full weight on the left leg and movements in his own home is becoming increasingly paralyzed. [facility] has not left his own room or 10 days. [facility] is transferred to [facility] for 21 2022 when he is unable to ingest oral steroid or opioid therapy for pain management due to uncontrolled retching.  His life expectancy in a bedbound state with peritoneal carcinomatosis is less than 6 weeks.  Intent on admission is to achieve symptom control with parenteral opioid and antiemetic therapy.  Subsequently progressed to oral regimen for nausea and pain control which will allow the patient to return to his home for continued hospice care. Standing Status:   Standing     Number of Occurrences:   1     Order Specific Question:   Please describe the test or procedure you would like to order. Answer:   Assess for GIP, routine, or respite level of care.     NURSING-MISCELLANEOUS: record vital signs and urine output every 12 hours if patient condition permits CONTINUOUS     Standing Status:   Standing     Number of Occurrences:   1     Order Specific Question:   Description of Order:     Answer:   record vital signs and urine output every 12 hours if patient condition permits    DO NOT RESUSCITATE     Standing Status: Standing     Number of Occurrences:   1     Order Specific Question:   Comfort Measures Only? Answer: Yes    Movantik 25 mg tab tablet     Sig: Take 1 Tablet by mouth two (2) times daily as needed.  ondansetron hcl (ZOFRAN) 8 mg tablet     Sig: Take 8 mg by mouth three (3) times daily as needed.     HYDROmorphone (DILAUDID) injection 1 mg    haloperidol lactate (HALDOL) injection 4 mg    heparin (porcine) pf 500 Units    DISCONTD: sodium chloride (NS) flush 10 mL    DISCONTD: heparin (porcine) pf 300 Units    sodium chloride (NS) flush 10 mL    DISCONTD: heparin (porcine) pf 300 Units    OR Linked Order Group     HYDROmorphone (DILAUDID) injection 1 mg     HYDROmorphone (DILAUDID) injection 1 mg    OR Linked Order Group     haloperidol lactate (HALDOL) injection 4 mg     haloperidol lactate (HALDOL) injection 4 mg    bisacodyL (DULCOLAX) suppository 10 mg    DISCONTD: alum-mag hydroxide-simeth (MYLANTA) oral suspension 30 mL    DISCONTD: pantoprazole (PROTONIX) tablet 40 mg     Order Specific Question:   PPI INDICATION     Answer:   Symptomatic GERD    DISCONTD: fentaNYL (DURAGESIC) 25 mcg/hr patch 1 Patch    dexamethasone (DECADRON) 4 mg/mL injection 4 mg    DISCONTD: hyoscyamine (LEVSIN) 0.125mg/mL solution 250 mcg (Patient Supplied)    DISCONTD: metoclopramide HCl (REGLAN) injection 5 mg    DISCONTD: diphenhydrAMINE (BENADRYL) injection 50 mg    ondansetron (ZOFRAN) injection 4 mg    DISCONTD: alum-mag hydroxide-simeth (MYLANTA) oral suspension 30 mL    DISCONTD: pantoprazole (PROTONIX) tablet 40 mg     Order Specific Question:   PPI INDICATION     Answer:   Symptomatic GERD    DISCONTD: senna (SENOKOT) tablet 17.2 mg    bisacodyL (DULCOLAX) suppository 10 mg    DISCONTD: metoclopramide HCl (REGLAN) tablet 5 mg    LORazepam (ATIVAN) injection 1 mg    DISCONTD: sodium chloride (NS) flush 3 mL    OR Linked Order Group     haloperidol lactate (HALDOL) injection 4 mg     haloperidol lactate (HALDOL) injection 4 mg    haloperidol lactate (HALDOL) injection 4 mg    carboxymethylcellulose sodium (REFRESH LIQUIGEL) 1 % ophthalmic solution 1 Drop    sodium chloride (NS) flush 10 mL    AND Linked Order Group     fentaNYL (DURAGESIC) 25 mcg/hr patch 1 Patch     fentaNYL (DURAGESIC) 12 mcg/hr patch 1 Patch    INITIAL PHYSICIAN ORDER: HOSPICE Level Of Care: General Inpatient; Reason for Admission: refractory vomiting     Standing Status:   Standing     Number of Occurrences:   1     Order Specific Question:   Status     Answer:   Hospice     Order Specific Question:   Level Of Care     Answer:   General Inpatient     Order Specific Question:   Reason for Admission     Answer:   refractory vomiting     Order Specific Question:   Inpatient Hospitalization Certified Necessary for the Following Reasons     Answer:   3. Patient receiving treatment that can only be provided in an inpatient setting (further clarification in H&P documentation)     Order Specific Question:   Admitting Diagnosis     Answer:   Refractory nausea and vomiting [0336739]     Order Specific Question:   Terminal Prognosis Diagnosis(es)     Answer:   Metastatic adenocarcinoma involving skeletal bone with unknown primary site [facility]) [[id number]]     Order Specific Question:   Terminal Prognosis Diagnosis(es)     Answer:   Pancreatic cancer metastasized to liver [facility]) [0956433]     Order Specific Question:   Terminal Prognosis Diagnosis(es)     Answer: Malignant ascites [789.51. ICD-9-CM]     Order Specific Question:   Admitting Physician     Answer:   Nayla Fung [[id number]]     Order Specific Question:   Attending Physician     Answer:   Teri Ma     Order Specific Question:   Discharge Plan:     Answer:    Other (Specify)    INITIAL PHYSICIAN ORDER: HOSPICE Level Of Care: General Inpatient; Reason for Admission: manage refractory nausea and vomitting     Standing Status:   Standing     Number of Occurrences:   1     Order Specific Question:   Status     Answer:   Hospice     Order Specific Question:   Level Of Care     Answer:   General Inpatient     Order Specific Question:   Reason for Admission     Answer:   manage refractory nausea and vomitting     Order Specific Question:   Inpatient Hospitalization Certified Necessary for the Following Reasons     Answer:   3. Patient receiving treatment that can only be provided in an inpatient setting (further clarification in H&P documentation)     Order Specific Question:   Admitting Diagnosis     Answer:   Cancer, pancreas, body [facility]) [[id number]]     Order Specific Question:   Terminal Prognosis Diagnosis(es)     Answer:   Metastasis to bone [facility]) [[id number]]     Order Specific Question:   Terminal Prognosis Diagnosis(es)     Answer:   History of known metastasis to liver [7396023]     Order Specific Question:   Terminal Prognosis Diagnosis(es)     Answer: Malignant ascites [789.51. ICD-9-CM]     Order Specific Question:   Terminal Prognosis Diagnosis(es)     Answer:   Peritoneal carcinomatosis [facility] [[id number]]     Order Specific Question:   Terminal Prognosis Diagnosis(es)     Answer:   Back pain [408593]     Order Specific Question:   Terminal Prognosis Diagnosis(es)     Answer:   Cancer-related breakthrough pain [[id number]]     Order Specific Question:   Terminal Prognosis Diagnosis(es)     Answer:   Drug-induced ileus [facility]) [[id number]]     Order Specific Question:   Terminal Prognosis Diagnosis(es)     Answer:   Paroxysmal atrial fibrillation with rapid ventricular response [facility]) [[id number]]     Order Specific Question:   Terminal Prognosis Diagnosis(es)     Answer:   Peripheral neuropathy due to and not concurrent with chemotherapy [facility] [[id number]]     Order Specific Question:   Admitting Physician     Answer:   Vini Barcenas [[id number]]     Order Specific Question:   Attending Physician     Answer:   Beatriz Diop     Order Specific Question:   Discharge Plan: Answer:   Home with Home Hospice       Allergies:  No Known Allergies    Care Plan:  04/15/2022 Metastatic Adenocarcinoma of Pancreas Problems (Active)     Problem: Anticipatory Grief     Dates: Start: 04/18/22       Description: Patient/family grief (anticipatory, past, present)    Disciplines: Interdisciplinary    Goal: Grief heard and acknowledged, anxiety reduced, patient coping identified, patient/family expressed gratitude     Dates: Start: 04/18/22       Description: GOAL:  Pt Phyllis Melendez and wife Billie Fleming will express feelings of grief, gratitude, anger, hope, etc. rooted in their [address] and their love for each other, during benefit period. 4/18/22  Progress AEB pt Phyllis Melendez saying he is not happy to be terminal but is reconciled to it and intending to have a positive attitude. Disciplines: Interdisciplinary    Intervention: Support grieving process     Dates: Start: 04/18/22       Description: Address feelings of loss, anticipatory grief, expression of concerns. Pt/family will demonstrate ability to cope with loss and grief       Intervention: Assess grief responses     Dates: Start: 04/18/22       Description: Assess pt/family for feelings of grief             Problem: Community Resource Needs     Dates: Start: 04/18/22       Description: Deficit related to resource support. Disciplines: Interdisciplinary    Goal: Patient is receiving increased resource support to enhance ability to remain at home     Dates: Start: 04/18/22       Description: Patient/spouse is receiving increased resource support to enhance ability to remain at home AEB pt/spouse verbalize understanding of available community resources within certification benefit ending 7/13/22.     Disciplines: Interdisciplinary    Intervention: Provide assistance with identifying appropriate community resources     Dates: Start: 04/18/22       Description: Provide assistance with identifying appropriate community resources             Problem: Emotional Support Needs     Dates: Start: 04/18/22       Description: Deficit related to emotional support. Disciplines: Interdisciplinary    Goal: Patient/family is receiving emotional support     Dates: Start: 04/18/22       Description: Patient/spouse is receiving emotional support AEB pt/spouse to demonstreate improved coping and verbalize emotional needs throughout certifiaction ending 7/13/22. Disciplines: Interdisciplinary    Intervention: Provide emotional support     Dates: Start: 04/18/22       Description: Provide emotional support       Intervention: Provide emotional support of the family's cultural expressions of grief and loss     Dates: Start: 04/18/22       Description: Provide emotional support of the family's cultural expressions of grief, loss, and response to illness.        Intervention: Assess for emotional distress     Dates: Start: 04/18/22       Description: Assess for emotional distress             Problem: GI dysfunction due to end of life     Dates: Start: 04/20/22       Description: Twin Rodriguez has intractable nausea and vomiting due to disease process    Disciplines: Interdisciplinary    Goal: *Palliation of nausea/vomiting (Palliative Care)     Dates: Start: 04/20/22       Description: Clayton's symptoms of nausea and vomiting will be controlled using aggressive measures within 48 hours    Disciplines: Interdisciplinary    Intervention: Manage nausea and vomiting     Dates: Start: 04/20/22       Description: Twin Rodriguez to be transported to [facility] for aggressive symptom management of his nausea and vomiting related to his Pancreatic Cancer today 4/20/22             Problem: Hospice Orientation     Dates: Start: 04/15/22       Description: Patient and PCG require hospice orientation r/t patient admission to [facility] for metastatic adenocarcinoma of pancreas    Disciplines: Interdisciplinary    Goal: Demonstrate understanding of hospice philosophy, plan of care, and home hospice program Dates: Start: 04/15/22   End: 05/15/22    Description: AUTUMN Casas and Pat Portillo will demonstrate understanding of the hospice philosophy, plan of care and the home hospice program as evidenced by participation in meeting Clayton's psychosocial, spiritual, medical, and physical needs inclusive of medical supplies/equipment focusing on symptoms within one month. Disciplines: Interdisciplinary    Intervention: Instruct: hospice orientation     Dates: Start: 04/15/22   End: 05/15/22    Description: Instruct patient/caregiver on hospice orientation             Problem: Infection Prevention     Dates: Start: 04/15/22       Description: PCG requires continued education on prevention of infection r/t immunocompromised patient    Disciplines: Interdisciplinary    Goal: *Prevention of infection     Dates: Start: 04/15/22   End: 05/15/22    Description: AUTUMN Robison) will verbalize understanding and demonstrate ways to prevent infection within 30 days. Disciplines: Interdisciplinary    Intervention: Instruct infection prevention     Dates: Start: 04/15/22   End: 05/15/22    Description: Instruct PCG on the importance of infection prevention measures, such as proper hand hygiene technique, use of appropriate PPE, cleaning environment and disinfecting surfaces. Problem: Knowledge deficit related to High Risk Medications     Dates: Start: 04/15/22       Description: PCG requires continued education on use of insulin r/t patient pescribed insulin secondary to diabetes mellitus type 2    Disciplines: Interdisciplinary    Goal: Patient/caregiver will demonstrate knowledge of insulin medication(s)     Dates: Start: 04/15/22   End: 04/29/22    Description: AUTUMN Robison) will demonstrate knowledge of insulin medication as evidenced by teaching back purpose and scheduling of medication and stating at least one risk/reportable side effect and how to mitigate it within 2 week(s).     Disciplines: Interdisciplinary Intervention: Instruct on insulin medication(s)     Dates: Start: 04/15/22   End: 04/29/22    Description: Instruct caregiver on insulin medication(s) purpose, dose, appearance, preparation, storage, and scheduling, as well as s/sx of hypoglycemia (shakiness, dizziness, sweating, confusion, and irritability) and hyperglycemia (nausea/vomiting, shortness of breath, fruity-smelling breath, weakness, and dry mouth). Problem: Management of Home Medications     Dates: Start: 04/15/22       Description: PCG requires continued education on management of home medications    Disciplines: Interdisciplinary    Goal: Knowledge of medication management     Dates: Start: 04/15/22   End: 04/29/22    Description: PCG Jose Grover) will follow prescribed medication therapy and schedule, and verbalize understanding of purpose and side effects of medication within 2 weeks. Disciplines: Interdisciplinary    Intervention: Instruct on management of home medications     Dates: Start: 04/15/22   End: 04/29/22    Description: Instruct caregiver in medication administration, purpose, dosages, preparation, scheduling, side effects, food/drug interactions, storage, and potential complications. Goal: *Complies with medication therapy     Dates: Start: 04/15/22   End: 07/14/22    Description: PCG Jose Grover) will receive and comply with hospice prescribed medications AEB symptoms are being managed during this benefit period. Disciplines: Interdisciplinary    Intervention: Medication reconciliation     Dates: Start: 04/15/22   End: 07/14/22    Description: Perform medication reconciliation every visit and update patient's med list copy for new,  changed, discontinued medications as needed . Assess for effectiveness and compliance with medications every visit.                   Problem: Pain     Dates: Start: 04/15/22       Description: Patient experiencing chronic pain r/t metastatic cancer    Disciplines: Interdisciplinary Goal: Assess satisfaction of level of comfort and symptom control     Dates: Start: 04/15/22   End: 07/14/22    Description: Varinder Manning and/or AUTUMN Zamudio) will verbalize satisfaction with Clayton's level of comfort and symptom control AEB verbalization of chronic pain to an acceptable level of 3/10 or less throughout current hospice benefit period. Disciplines: Interdisciplinary    Intervention: Assess effectiveness of pain management     Dates: Start: 04/15/22   End: 07/14/22    Description: Assess effectiveness of pain management for patient each visit             Problem: Portacath     Dates: Start: 04/16/22       Description: Patient has a Portacath to R Upper Chest r/t hx of chemotherapy    Disciplines: Interdisciplinary    Goal: Knowledge of catheter care     Dates: Start: 04/16/22   End: 07/15/22    Description: Nell Camara will have no complications to VAD site throughout this benefit period. Disciplines: Interdisciplinary    Intervention: CATHETER CARE     Dates: Start: 04/16/22   End: 07/15/22    Description: PORTACATH R Upper Chest:  Hospice RN to Flush with 10cc NS followed by 300 units of Heparin intracatheter once monthly. Problem: Risk for Falls     Dates: Start: 04/15/22       Description: Patient is at risk for falls r/t MAHC-10 score of 7/10    Disciplines: Interdisciplinary    Goal: Knowledge of fall prevention     Dates: Start: 04/15/22   End: 05/15/22    Description: AUTUMN Singh and Nell Camara will demonstrate use of safety precautions to prevent falls and improve overall patient safety within one month. Disciplines: Interdisciplinary    Intervention: Instruct on fall prevention     Dates: Start: 04/15/22   End: 05/15/22    Description: Assess recent falls every visit. Instruct patient/caregiver in fall prevention strategies: use of assistive device, adequate lighting, and monitor medication that may alter mental status. Document and report falls.                 Problem: Spiritual Evaluation     Dates: Start: 04/18/22       Description: Identify patient/family spiritual or Spiritism resources    Disciplines: Interdisciplinary    Goal: Identify beliefs/practices that support hospice experience (Resolved)     Dates: Start: 04/18/22   End: 04/18/22    Description: GOAL:  Patient/family identify their beliefs/practices that impair/support Hospice experience. 4/18/22 Completed AEB pt Anas and wife Tanias attesting to their [employer]. Disciplines: Interdisciplinary         Encounter Problems (Active)     Problem: Anticipatory Grief     Dates: Start: 04/20/22       Disciplines: Interdisciplinary    Goal: Explore reactions to and verbalize acceptance of impending loss     Dates: Start: 04/20/22   Expected End: 04/29/22       Description: Patient/family/caregiver will explore reactions to and verbalize acceptance of impending loss. Disciplines: Interdisciplinary    Intervention: Assess grief responses     Dates: Start: 04/20/22          Intervention: Assist with grief counseling     Dates: Start: 04/20/22       Description:  to assist.       Intervention: Derrick Pena on stages of grief     Dates: Start: 04/20/22       Description: Instruct patient/caregiver on stages of grief. Intervention: Offer grief support group     Dates: Start: 04/20/22       Description: Patient/family/caregiver will explore reactions to and verbalize acceptance of impending loss.              Problem: Anticipatory Grief     Dates: Start: 04/21/22       Disciplines: Interdisciplinary    Goal: Grief heard and acknowledged, anxiety reduced, patient coping identified, patient/family expressed gratitude     Dates: Start: 04/21/22   Expected End: 04/29/22       Description: GOAL: Maya Maggierai and Davida Estrella ( brother)   will demonstrate appropriate anticipatory grief reactions related to Anas  impending death as evidenced by  their ability to verbalize feelings of denial, bargaining, anger, and depression, display feelings and associated behaviors in an acceptable manner during inpatient hospice stay. Disciplines: Interdisciplinary    Intervention: Assess grief responses     Dates: Start: 04/21/22       Description: Ludy Taylor will assess grief reactions with each encounter. Intervention: Support grieving process     Dates: Start: 04/21/22       Description: Ludy Taylor will support the grief process by providing a safe space for open, nonjudgmental communication and education on anticipatory grief. Problem: Discharge Planning     Dates: Start: 04/20/22       Disciplines: Interdisciplinary    Goal: *Participates in discharge planning     Dates: Start: 04/20/22   Expected End: 04/29/22       Description: Patient Nicolasa Clements Pt, and pt's family/ care giver will receive support from community resources through the support of SW such as SNF placements, SW calling facilities for the family, updating the family/care giver on facilities, and sending out referrals for pt and family/ care giver.        Disciplines: Interdisciplinary    Intervention: Healthcare knowledge assessment to include dying process, prognosis, treatment regimen, medications upon discharge     Dates: Start: 04/20/22          Intervention: Advanced care planning     Dates: Start: 04/20/22          Intervention: Identify support systems     Dates: Start: 04/20/22                Problem: Emotional Support Needs     Dates: Start: 04/21/22       Disciplines: Interdisciplinary    Goal: Patient/family is receiving emotional support     Dates: Start: 04/21/22   Expected End: 04/29/22       Description: Pt, pt's spouse Mike Choudhury, pt's brother Cassandra Baumann, and pt's family/friends will receive emotional support from SW weekly through weekly check-ins, education on the hospice philosophy, active listening, rapport building, and validation of feelings throughout pt's hospice journey as evidenced by their voiced understanding of LOC changes and room and board charges, lack of heightened emotional responses, and acknowledgement of SW services available. Disciplines: Interdisciplinary    Intervention: Assess for emotional distress     Dates: Start: 04/21/22       Description: SW will assess for emotional distress in pt, Lobo Castles, and family/friends through the use of open ended questions, motivational interviewing techniques, and observation/assessment of verbal and nonverbal cues. Intervention: Provide emotional support of the family's cultural expressions of grief and loss     Dates: Start: 04/21/22       Description: SW will provide emotional support of the family's cultural expression of grief, loss, and response to illness. Problem: End of Life Process     Dates: Start: 04/20/22       Disciplines: Interdisciplinary    Goal: Demonstrate understanding of end of life processes     Dates: Start: 04/20/22   Expected End: 04/29/22       Description:   Patient Nicolasa Clements family/ caregiver will verbalize recall or return demonstration of emotional support and stress-reduction strategies and appropriate grieving on admission and ongoing as needed by the IDG members throughout the dying process during inpatient hospice stay.        Disciplines: Interdisciplinary    Intervention: Assess for signs/symptoms of terminal restlessness     Dates: Start: 04/20/22          Intervention: Implement strategies to reduce terminal restlessness     Dates: Start: 04/20/22          Intervention: Instruct on the dying process     Dates: Start: 04/20/22          Intervention: Instruct: imminent death     Dates: Start: 04/20/22          Intervention: Instruct: process at end of life     Dates: Start: 04/20/22                Problem: Infection - Risk of, Central Venous Catheter-Associated Bloodstream Infection     Dates: Start: 04/20/22       Disciplines: Interdisciplinary    Goal: *Absence of infection signs and symptoms     Dates: Start: 04/20/22 Expected End: 04/29/22       Description: Patient Elda Clements will remain free from infection R/T Central Venous Line AEB absence of signs and symptoms of infection each shift during inpatient hospice stay. Disciplines: Interdisciplinary    Intervention: Central venous catheter site(s) assessment (eg: Pain; color; exudate; edema; odor; skin integrity)     Dates: Start: 04/20/22          Intervention: Infection assessment -general (eg: Onset of weakness; white blood cells; shivering; hyper/hypoglycemia; temp/weight/energy/mental status altered; tachycardia; hypotension; tachypnea; respiratory)     Dates: Start: 04/20/22          Intervention: Central venous catheter management per bundle (eg: Lines maintained; port sterility maintained; dressing changed per policy and procedure)     Dates: Start: 04/20/22                Problem: Infection - Risk of, Urinary Catheter-Associated Urinary Tract Infection     Dates: Start: 04/22/22       Disciplines: Interdisciplinary    Goal: *Absence of infection signs and symptoms     Dates: Start: 04/22/22   Expected End: 04/29/22       Description: Patient Elda Clements will remain free from infection R/T urinary catheter AEB absence of signs and symptoms of infection each shift during inpatient hospice stay. Disciplines: Interdisciplinary    Intervention: Urinary catheter needs assessment     Dates: Start: 04/22/22       Description: Medically/surgically unstable; Chemically paralyzed; Obstruction/retention; Strict I/Os;  Surgical procedure; Comfort Care; Multiple Stage 3 or 4 pressure ulcers, chest to knees       Intervention: Urine assessment (eg: Clarity; color; odor; volume)     Dates: Start: 04/22/22          Intervention: Infection signs and symptoms monitoring - urinary tract (eg: Flank or suprapubic pain; burning; fever; dysuria; frequency; urgency; cloudy/bloody/foul odor urine; confusion/agitation; incontinence)     Dates: Start: 04/22/22 Intervention: Lab monitoring (eg: Urinalysis; culture and sensitivity; complete blood count with differential)     Dates: Start: 04/22/22          Intervention: Urinary catheter management (eg: Closed urinary catheter system maintenance; urinary catheter patency with free downhill flow; perineal care; monitor intake and output)     Dates: Start: 04/22/22          Intervention: Urinary catheter discontinuation     Dates: Start: 04/22/22                Problem: Nausea/Vomiting (Adult)     Dates: Start: 04/20/22       Disciplines: Interdisciplinary    Goal: *Absence of nausea/vomiting     Dates: Start: 04/20/22   Expected End: 04/29/22       Description: Patient Esme Clements will exhibit decreased or eliminated episodes of Nausea/Vomiting AEB less than 2 PRN antiemetic medication administration each shift during inpatient hospice stay. Disciplines: Interdisciplinary    Intervention: Aspiration risk - signs and symptoms (eg: Ineffective cough; altered level of consciousness; impaired mobility; drooling; poor dentition; vomiting; slurred speech; prolonged supine)     Dates: Start: 04/20/22          Intervention: Nonpharmacologic nausea management (eg:  Consistent room temperature; deep breathing; distraction; ice chips; minimal moving; pain management)     Dates: Start: 04/20/22          Intervention: Administer antiemetics as needed     Dates: Start: 04/20/22                Problem: Pain     Dates: Start: 04/20/22       Disciplines: Interdisciplinary    Goal: Assess satisfaction of level of comfort and symptom control     Dates: Start: 04/20/22   Expected End: 04/29/22       Description: Feliciano Clements will exhibit decrease in pain AEB rating pain less than 3 on 1-10 scale or 3 FLACC score within each shift of receiving pain medication during inpatient hospice stay.         Disciplines: Interdisciplinary    Intervention: Assess effectiveness of pain management     Dates: Start: 04/20/22 Intervention: Assess for signs/symptoms of acute pain     Dates: Start: 04/20/22          Intervention: Assess for signs/symptoms of chronic pain     Dates: Start: 04/20/22          Intervention: Implement non-pharmacological pain management     Dates: Start: 04/20/22          Intervention: Instruct on pain scales     Dates: Start: 04/20/22          Intervention: Implement pharmacological pain management     Dates: Start: 04/20/22                Problem: Pressure Injury - Risk of     Dates: Start: 04/20/22       Disciplines: Interdisciplinary    Goal: *Prevention of pressure injury     Dates: Start: 04/20/22   Expected End: 04/29/22       Description: Patient Yan Clements will remain free from acquired pressure injuries AEB zero acquired pressure injuries during assessment of skin each shift during inpatient hospice stay. Disciplines: Interdisciplinary          Problem: Risk for Falls     Dates: Start: 04/20/22       Disciplines: Interdisciplinary    Goal: Free of falls during inpatient stay     Dates: Start: 04/20/22   Expected End: 04/29/22       Description: Patient Yan Clements will remain free from falls AEB no injuries r/t falls each shift during inpatient hospice stay. Disciplines: Interdisciplinary    Intervention: Assess fall risk     Dates: Start: 04/20/22       Description: Complete fall risk assessment on admission, recertification, and as indicated on fall.        Intervention: Instruct on fall prevention     Dates: Start: 04/20/22       Description: Call for assistance with ambulation and transfers during inpatient stay             Problem: Spiritual Evaluation     Dates: Start: 04/21/22       Disciplines: Interdisciplinary    Goal: Identify beliefs/practices that support hospice experience     Dates: Start: 04/21/22   Expected End: 04/29/22       Description: Goal:  Castro Quick and family will verbalize experiencing peace and calmness about death and the afterlife based on their beliefs/ cain tradition. Calmness evidenced by normal respirations and lack of reported anxiety and an ability to communicate hope during Clayton's time with [facility]. Disciplines: Interdisciplinary    Intervention: Assess spiritual needs     Dates: Start: 04/21/22       Description: [address] will assess patient/ family spiritual needs. Intervention: Assist with spiritual resources     Dates: Start: 04/21/22       Description: [address] will provide spiritual resources needed to support patient and family's spiritual needs. Intervention: Provide spiritual support     Dates: Start: 04/21/22       Description: [address] will provide spiritual and emotional support throughout Mr. Clements's time with [facility].              Care Plan Problems/Goals  Report    3 of 15 Goals Resolved/Met 3 of 15 Resolved/Met     Progressing Towards Goal (12)      *Prevention of pressure injury (Pressure Injury - Risk of)    Disciplines:  Interdisciplinary Expected end:  04/29/22        Outcome: Progressing Towards Goal By Anna Marie Patton RN on 04/25/22 7052            Free of falls during inpatient stay (Risk for Falls)    Disciplines:  Interdisciplinary Expected end:  04/29/22        Outcome: Progressing Towards Goal By Anna Marie Patton RN on 04/25/22 9308            Assess satisfaction of level of comfort and symptom control (Pain)    Disciplines:  Interdisciplinary Expected end:  04/29/22        Outcome: Progressing Towards Goal By Anna Marie Patton RN on 04/25/22 0218            Explore reactions to and verbalize acceptance of impending loss (Anticipatory Grief)    Disciplines:  Interdisciplinary Expected end:  04/29/22        Outcome: Progressing Towards Goal By Freida Zhao RN on 04/24/22 0321            *Absence of nausea/vomiting (Nausea/Vomiting (Adult))    Disciplines:  Interdisciplinary Expected end:  04/29/22        Outcome: Progressing Towards Goal By Anna Marie Patton RN on 04/25/22 0218            *Absence of infection signs and symptoms (Infection - Risk of, Central Venous Catheter-Associated Bloodstream Infection)    Disciplines:  Interdisciplinary Expected end:  04/29/22        Outcome: Progressing Towards Goal By Latoya Almeida RN on 04/25/22 1761            Demonstrate understanding of end of life processes (End of Life Process)    Disciplines:  Interdisciplinary Expected end:  04/29/22        Outcome: Progressing Towards Goal By Edwin Sahni RN on 04/24/22 7032            *Participates in discharge planning (Discharge Planning)    Disciplines:  Interdisciplinary Expected end:  04/29/22        Outcome: Progressing Towards Goal By Edwin Sahni RN on 04/24/22 1748            Patient/family is receiving emotional support (Emotional Support Needs)    Disciplines:  Interdisciplinary Expected end:  04/29/22        Outcome: Progressing Towards Goal By Yesy Burr RN on 04/23/22 2314            Identify beliefs/practices that support hospice experience (Spiritual Evaluation)    Disciplines:  Interdisciplinary Expected end:  04/29/22        Outcome: Progressing Towards Goal By Yesy Burr RN on 04/23/22 2314            Grief heard and acknowledged, anxiety reduced, patient coping identified, patient/family expressed gratitude (Anticipatory Grief)    Disciplines:  Interdisciplinary Expected end:  04/29/22        Outcome: Progressing Towards Goal By Yesy Burr RN on 04/23/22 2314            *Absence of infection signs and symptoms (Infection - Risk of, Urinary Catheter-Associated Urinary Tract Infection)    Disciplines:  Interdisciplinary Expected end:  04/29/22        Outcome: Progressing Towards Goal By Latoya Almeida RN on 04/25/22 0218                         Resolved/Met (3)      Patient/Family Education (Patient Education: Go to Patient Education Activity)    Disciplines:  Interdisciplinary Expected end:  04/23/22        Outcome: Resolved/Met By Mikayla Westbrook, RN on 04/20/22 6477            Demonstrate understanding of hospice philosophy, plan of care, and home hospice program [facility] Orientation)    Disciplines:  Interdisciplinary Expected end:  04/23/22        Outcome: Resolved/Met By Jessa Robledo RN on 04/21/22 1255            Patient/Family Education (Patient Education: Go to Patient Education Activity)    Disciplines:  Interdisciplinary Expected end:  04/29/22        Outcome: Resolved/Met By Lydia Chaparro RN on 04/24/22 0904                              ___________________    Care Team Notes          POC/IDG Notes      [facility] IDG Nurse Notes by Jigar Jefferson RN at 04/25/22 1038  Version 1 of 1    Author: Jigar Jefferson RN Service: NURSING Author Type: Registered Nurse    Filed: 04/25/22 1049 Date of Service: 04/25/22 1038 Status: Signed    : Jigar Jefferson RN (Registered Nurse)         Patient: Nicholas Clements    Date: 04/25/22  Time: 10:38 AM    [facility] Nurse Notes  Follow Up  IDG: Pt is a 63y.o. year-old male with pancreatic cancer who is here GIP level of care for management of pain, nausea and vomiting. [place] with UOP of 200ml   IV access: Implanted port   PO intake:NPO  Oxygen: Room air  Wounds:  None  PRN medications: Dilaudid 1 mg x 3 doses for pain. Scheduled meds:    Current Facility-Administered Medications   Medication Dose Route Frequency    haloperidol lactate (HALDOL) injection 4 mg  4 mg IntraVENous Q8H    Or    haloperidol lactate (HALDOL) injection 4 mg  4 mg IntraMUSCular Q8H    [Held by provider] senna (SENOKOT) tablet 17.2 mg  2 Tablet Oral BID    sodium chloride (NS) flush 3 mL  3 mL IntraVENous Q8H    [Held by provider] pantoprazole (PROTONIX) tablet 40 mg  40 mg Oral ACB&D    fentaNYL (DURAGESIC) 25 mcg/hr patch 1 Patch  1 Patch TransDERmal Q72H    dexamethasone (DECADRON) 4 mg/mL injection 4 mg  4 mg IntraVENous Q8H     Plan: Increase Duragesic to 37 mcg Q 72 hours. D/C PO medications. Comprehensive plan of care reviewed. IDG and pt./family in agreement with plan of care. The IDG identifies through on-going assessment when a change is needed to the POC; the pt/family will receive care and services necessitated by changes in POC. Medications reviewed by the pharmacist and Medical Director. Signed by: Vanessa Rodas RN       [facility] IDG  Notes by Mono Henson LMSW at 04/25/22 1036  Version 1 of 1    Author: Mono Henson LMSW Service: -- Author Type: Licensed Masters in Social Work    Filed: 04/25/22 1042 Date of Service: 04/25/22 1036 Status: Signed    : Radha Nielsen [facility] (Licensed Masters in Social Work)       Patient: Fatoumata Palencia. Starr    Date: 04/25/22  Time: 10:36 AM    [facility]  Notes  Pt remains under GIP LOC. No changes in the plan of care. SW will continue to provide ongoing emotional support and assessment of psychosocial and bereavement concerns, as well as needs until discharge. Signed by: Benji Hutchinson LMSW       [facility] IDG  Notes by Endy John at 04/25/22 1041  Version 1 of 1    Author: Endy John Service: Spiritual Care Author Type: Pastoral Care    Filed: 04/25/22 1042 Date of Service: 04/25/22 1041 Status: Signed    : Endy John (Pastoral Care)       Patient: Fatoumata Palencia. Starr    Date: 04/25/22  Time: 10:41 AM    [facility]  Notes  Intervention: Ministry of presence, prayer, family support. Outcome: Meaningful conversation with Daniel Coon. Plan: Continue to provide spiritual and emotional support. Signed by: Justine Martinez       [facility] IDG  Notes by Endy John at 04/22/22 1027  Version 1 of 1    Author: Endy John Service: Spiritual Care Author Type: Pastoral Care    Filed: 04/22/22 1031 Date of Service: 04/22/22 1027 Status: Signed    : Endy John (Pastoral Care)       Patient: Fatoumata Palencia.  Starr    Date: 04/22/22  Time: 10:27 AM    [facility]  Notes  Intervention: Ministry of presence. Spiritual and Bereavement Assessments completed. Support provided for wife through active listening, affirmation of Blessing, compassion and prayer. Outcome: Chelsea High was able to share their story. She was tearful at times but appropriately expressing her anticipatory grief. Plan: Continue to provide spiritual and emotional support. Focus on Anticipatory Grief. Signed by: Yani Rodriguez       [address] [place] [address] IDG Nurse Notes by Stephanie Becker RN at 04/22/22 1012  Version 1 of 1    Author: Stephanie Becker RN Service: NURSING Author Type: Registered Nurse    Filed: 04/22/22 1024 Date of Service: 04/22/22 1012 Status: Signed    : Stephanie Becker RN (Registered Nurse)         Patient: Peggy Clements    Date: 04/22/22  Time: 10:12 AM    hospitals Nurse Notes  1st  IDG: Pt is a 63y.o. year-old male with pancreatic cancer who is here GIP level of care for management of pain, nausea and vomiting. Continent and producing urine   IV access: Implanted port   PO intake:Diet as tolerated  Oxygen: Room air  Wounds: None   PRN medications: Dilaudid 1 mg IV x 1 dose for pain. Scheduled meds:    Current Facility-Administered Medications   Medication Dose Route Frequency    pantoprazole (PROTONIX) tablet 40 mg  40 mg Oral ACB&D    sodium chloride (NS) flush 10 mL  10 mL InterCATHeter Q12H    heparin (porcine) pf 300 Units  300 Units InterCATHeter Q12H    fentaNYL (DURAGESIC) 25 mcg/hr patch 1 Patch  1 Patch TransDERmal Q72H    dexamethasone (DECADRON) 4 mg/mL injection 4 mg  4 mg IntraVENous Q8H    metoclopramide HCl (REGLAN) injection 5 mg  5 mg IntraVENous Q6H     Plan: Add Senna 4 tab Q D/ Add dulcolax suppository / Change Reglan 5 mg PO AC and HS. Comprehensive plan of care reviewed. IDG and pt./family in agreement with plan of care.  The IDG identifies through on-going assessment when a change is needed to the POC; the pt/family will receive care and services necessitated by changes in POC. Medications reviewed by the pharmacist and Medical Director. Signed by: Jacob Liang RN       [facility] IDG  Notes by Frances Flores LMSW at 04/22/22 1015  Version 1 of 1    Author: Frances Flores LMSW Service: -- Author Type: Licensed Masters in Social Work    Filed: 04/22/22 1016 Date of Service: 04/22/22 1015 Status: Signed    : Frances Flores, [facility] (Licensed Masters in Social Work)       Patient: Humaira Hay. Starr    Date: 04/22/22  Time: 10:15 AM    [facility]  Notes  Pt remains under GIP LOC. If pt LOC changes, this discussion will be had with pt's spouse Shobha Galloway and she would like this to be a private conversation first before addressing with pt. No changes in the plan of care. SW will continue to provide ongoing emotional support and assessment of psychosocial and bereavement concerns, as well as needs until discharge. Signed by: Kimberly Giron LMSW       [facility] IDG  Notes by Radha Cisse at 04/20/22 1456  Version 1 of 1    Author: Radha Cisse Service: Spiritual Care Author Type: Pastoral Care    Filed: 04/20/22 1456 Date of Service: 04/20/22 1456 Status: Signed    : Radha Cisse (Pastoral Care)       Patient: Humaira Hay. Starr    Date: 04/20/22  Time: 2:56 PM    [facility]  Notes    / Grief Counselor has reviewed  Initial Comprehensive Assessment and plan of care. Bereavement and Spiritual Care Assessments to be completed and plan of care put in place to meet patient and family needs. Signed by: Jonathan Gibson       [facility] IDG Nurse Notes by Freida Zhao RN at 04/20/22 1246  Version 1 of 1    Author: Freida Zhao RN Service: -- Author Type: Registered Nurse    Filed: 04/20/22 1302 Date of Service: 04/20/22 1246 Status: Signed    : Freida Zhoa RN (Registered Nurse)       Patient: Humaira Hay.  Starr    Date: 04/20/22  Time: 12:46 PM    [facility] Nurse Notes  Pt arrives via [employer], GIP for Pancreatic Cancer, symptom management is for pain, nausea, and vomiting. Pt is alert and oriented x 4 c/o pain and nausea. He states that he does not have any energy and feels fatigued. Pt states that he has uncontrollable vomiting and a dry mouth. Last oral intake was a small amount of pudding on 4/19/2022 and no liquids. Pt to have 1 tsp of ice chips every 30 minutes other than that NPO. Pts breathing is regular non-labored, port on right side of chest. Port has not been used in >30d. Accessed, flushed with saline/heparin and patent. Abdomen distended with ascites, increased pain on palpation. Pt states his last bowel movement was 7 days prior. Bowel sounds hypoactive. Pts able to control bowel and bladder and is up to bedside commode. Pt wears a brief at times but does not have one on currently. Spine and buttocks redness noted. No breaks in skin, and blanchable. Pt is on a regular mattress. Upper extremities no edema noted, lower extremities +1 pitting edema in the feet only. Pt moves all extremities x4 with intent. Pt has a fentanyl 25mcg patch on right upper arm, placed by [facility] home RN, with orders to keep this patch in place. Pt NOK, Spouse Em Clements at bedside. Pts family oriented to [facility]. Signed by: Nemo Steel, JED       [facility] IDG  Notes by Mono Henson LMSW at 04/20/22 1245  Version 1 of 1    Author: Mono Henson LMSW Service: -- Author Type: Licensed Masters in Social Work    Filed: 04/20/22 1242 Date of Service: 04/20/22 1245 Status: Signed    : Radha Nielsen [facility] (Licensed Masters in Social Work)       Patient: Fatoumata Clements    Date: 04/20/22  Time: 12:45 PM    \[facility]\""  Notes  SW has read the initial comprehensive assessment and plan of care. No immediate needs noted. Initial SW assessment visit will be completed within 5 days of admission.          Signed by: Benji Hutchinson LMSW                Care Team Present:   Team Members Present: Physician,Nurse,,,Bereavement,Vol Coordinator,Other (Comment)  Physician Team Member: Dr. Tres Stuart  Nurse Team Member: Sveta Tran  Social Work Team Member: Venita Monaco   Team Member: Aidee Chapman Coordinator: Jamie Abraham  Other Discipline Present (Name):  Sowmya Siu NP

## 2022-04-25 NOTE — PROGRESS NOTES
Problem: Pressure Injury - Risk of  Goal: *Prevention of pressure injury  Description: Patient Nicole Clements will remain free from acquired pressure injuries AEB zero acquired pressure injuries during assessment of skin each shift during inpatient hospice stay. Outcome: Progressing Towards Goal  Note: Pressure Injury Interventions:  Sensory Interventions: Assess changes in LOC,Avoid rigorous massage over bony prominences,Check visual cues for pain,Float heels,Minimize linen layers    Moisture Interventions: Absorbent underpads,Apply protective barrier, creams and emollients,Limit adult briefs,Minimize layers,Moisture barrier    Activity Interventions: Assess need for specialty bed    Mobility Interventions: Assess need for specialty bed,Float heels    Nutrition Interventions: Document food/fluid/supplement intake    Friction and Shear Interventions: Apply protective barrier, creams and emollients,Lift sheet,Minimize layers                Problem: Risk for Falls  Goal: Free of falls during inpatient stay  Description: Feliciano Clements will remain free from falls AEB no injuries r/t falls each shift during inpatient hospice stay. Outcome: Progressing Towards Goal     Problem: Pain  Goal: Assess satisfaction of level of comfort and symptom control  Description: Feliciano Clements will exhibit decrease in pain AEB rating pain less than 3 on 1-10 scale or 3 FLACC score within each shift of receiving pain medication during inpatient hospice stay. Outcome: Progressing Towards Goal     Problem: Anticipatory Grief  Goal: Explore reactions to and verbalize acceptance of impending loss  Description: Patient/family/caregiver will explore reactions to and verbalize acceptance of impending loss. Outcome: Progressing Towards Goal     Problem: Nausea/Vomiting (Adult)  Goal: *Absence of nausea/vomiting  Description: Patient Nicole Clements will exhibit decreased or eliminated episodes of Nausea/Vomiting AEB less than 2 PRN antiemetic medication administration each shift during inpatient hospice stay. Outcome: Progressing Towards Goal     Problem: Infection - Risk of, Central Venous Catheter-Associated Bloodstream Infection  Goal: *Absence of infection signs and symptoms  Description: Patient Zeny Clements will remain free from infection R/T Central Venous Line AEB absence of signs and symptoms of infection each shift during inpatient hospice stay. Outcome: Progressing Towards Goal     Problem: End of Life Process  Goal: Demonstrate understanding of end of life processes  Description:   Patient Zeny Level. Starr family/ caregiver will verbalize recall or return demonstration of emotional support and stress-reduction strategies and appropriate grieving on admission and ongoing as needed by the IDG members throughout the dying process during inpatient hospice stay. Outcome: Progressing Towards Goal     Problem: Discharge Planning  Goal: *Participates in discharge planning  Description: Patient Zeny Level. Starr Pt, and pt's family/ care giver will receive support from community resources through the support of SW such as SNF placements, SW calling facilities for the family, updating the family/care giver on facilities, and sending out referrals for pt and family/ care giver. Outcome: Progressing Towards Goal     Problem: Infection - Risk of, Urinary Catheter-Associated Urinary Tract Infection  Goal: *Absence of infection signs and symptoms  Description: Patient Zeny Level. Starr will remain free from infection R/T urinary catheter AEB absence of signs and symptoms of infection each shift during inpatient hospice stay.      Outcome: Progressing Towards Goal

## 2022-04-25 NOTE — [facility] IDG CHAPLAIN NOTES
Patient: Fernando Garcia Starr    Date: 04/25/22  Time: 10:41 AM    \[facility] [place] [facility]\""  Notes  Intervention: Ministry of presence, prayer, family support. Outcome: Meaningful conversation with Jessica Damian. Plan: Continue to provide spiritual and emotional support.          Signed by: Marissa Cotter

## 2022-04-25 NOTE — PROGRESS NOTES
SW checked in on pt and pt's spouse Alford Blaire. Prashanth Rudd was tearful and discussed one of pt's good friends coming to visit. Pt gave SW an \"okay\" sign with his hand when SW assessed for needs. No needs reported at this time from Prashanth Rudd and pt. SW encouraged them to reach out with any needs.

## 2022-04-25 NOTE — [facility] IDG NURSE NOTES
Patient: Tosin Clements    Date: 04/25/22  Time: 10:38 AM    [facility] Nurse Notes  Follow Up  IDG: Pt is a 63y.o. year-old male with pancreatic cancer who is here GIP level of care for management of pain, nausea and vomiting. Fontana with UOP of 200ml   IV access: Implanted port   PO intake:NPO  Oxygen: Room air  Wounds:  None  PRN medications: Dilaudid 1 mg x 3 doses for pain. Scheduled meds:    Current Facility-Administered Medications   Medication Dose Route Frequency    haloperidol lactate (HALDOL) injection 4 mg  4 mg IntraVENous Q8H    Or    haloperidol lactate (HALDOL) injection 4 mg  4 mg IntraMUSCular Q8H    [Held by provider] senna (SENOKOT) tablet 17.2 mg  2 Tablet Oral BID    sodium chloride (NS) flush 3 mL  3 mL IntraVENous Q8H    [Held by provider] pantoprazole (PROTONIX) tablet 40 mg  40 mg Oral ACB&D    fentaNYL (DURAGESIC) 25 mcg/hr patch 1 Patch  1 Patch TransDERmal Q72H    dexamethasone (DECADRON) 4 mg/mL injection 4 mg  4 mg IntraVENous Q8H     Plan: Increase Duragesic to 37 mcg Q 72 hours. D/C PO medications. Comprehensive plan of care reviewed. IDG and pt./family in agreement with plan of care. The IDG identifies through on-going assessment when a change is needed to the POC; the pt/family will receive care and services necessitated by changes in POC. Medications reviewed by the pharmacist and Medical Director.         Signed by: Lucas Blanco RN

## 2022-04-25 NOTE — [facility] IDG SOCIAL WORKER NOTES
Patient: Fatemeh Hui Starr    Date: 04/25/22  Time: 10:36 AM    [facility]  Notes  Pt remains under GIP LOC. No changes in the plan of care. SW will continue to provide ongoing emotional support and assessment of psychosocial and bereavement concerns, as well as needs until discharge.          Signed by: Blanca Jj LMSW

## 2022-04-25 NOTE — PROGRESS NOTES
Report received from Pineda Roth RN. Patient identified by name and . Patient admitted under [facility] level of care with diagnosis of pancreatic cancer for management of pain and nausea/vomiting. Patient is currently lying in bed with his eyes but he does not appear to be interacting as if he is awake. His wife states he has been sleeping with his eyes open ever since he went into the hospital. He appears to be comfortable at this time his respirations are even and slightly labored. He awakens to his wife speaking to him but quickly returns to resting when he has answered her. He appears more lethargic and his color is more gray this evening. His wife states they have had a very busy day with visitors coming and going all day. She states they are both really tired from all of the activity. Encouraged her to let staff know if it is getting to be too much and staff can help keep visitors to a minimum and restrict the amount of time they are able to stay if they need that with agreement voiced. Fontana to gravity drain with erwin urine draining. MARCI 2/10. Discharge Plan is to remain at [facility] until he meets his demise. Reassessment of proper LOC with IDG team will be completed on an ongoing basis. Safety measures in place including, door open for continuous monitoring, bed in low locked position, tab alert in place, call light within reach, and side rails up x2. Will continue to monitor for changes, safety, and needs.  Patient continues to rest with eyes open, his respirations are even and slightly labored. He appears to be comfortable at this time, his facial features and body posture are calm and relaxed. He appears to be sleeping with his eyes open. His wife is at his bedside and states she believes he is resting comfortably at this time. Safety measures in place including, door open for continuous monitoring, bed in low locked position, tab alert in place, call light within reach, and side rails up x2. Will continue to monitor for changes, safety, and needs. FLACC 0/10.    2053 Patient given his scheduled medications and Dilaudid 1mg IVP per prn order for pain/dyspnea. He is beginning to have an increase in shortness of breath and also shakes his head yes to if he is having pain. Patient is alert and is trying to speak but his speech is difficult to understand. He will shake and nod his head to yes and no questions. Patient repositioned for comfort. His wife remains at the bedside and denies any current needs. Safety measures in place including, door open for continuous monitoring, bed in low locked position, tab alert in place, call light within reach, and side rails up x2. Will continue to monitor for changes, safety, and needs. FLACC 4/10.    2125 Patient is lying in bed with eyes open and appears to be resting quietly. He is calm and relaxed at this time, his respirations are not as labored and his shortness of breath has decreased. His wife states she feels he is relaxed and does not need anything at this time. Safety measures in place including, door open for continuous monitoring, bed in low locked position, tab alert in place, call light within reach, and side rails up x2. Will continue to monitor for changes, safety, and needs. FLACC 2/10.    2302 Pt observed on rounds, he is resting quietly with eyes closed and respirations remain unchanged. No facial grimacing, moaning, or signs of agitation observed. All symptoms managed at this time. No acute changes noted. FLACC 0/10. Safety measures remain in place, will continue to monitor for changes, safety, and comfort. 6946 Patient lying in bed with eyes closed lying in bed with his head elevated for comfort. Patient's respirations are even and slightly labored. Patient appears to be comfortable and in no acute distress. FLACC 0/10. Safety measures remain in place, will continue to monitor for changes, safety, and comfort.     8653 Patient continues to rest with eyes open. He does not appear to be in acute distress. His respirations remain unchanged and are 20 bpm at this time. Wife remains at his bedside and is resting on the pull out chair next to his bed. Safety measures in place including, door open for continuous monitoring, bed in low locked position, tab alert in place, call light within reach, and side rails up x2. Will continue to monitor for changes, safety, and needs. FLACC 0/10.    0345 Patient lying in bed with his eyes open and respirations remain unchanged. Patient given his scheduled Haldol 4mg IVP without incident. Patient awakens while getting his medications through his port a cath and is able to answer that he is not in pain at this time. Wife remains at his bedside and denies any current needs. Safety measures in place including, door open for continuous monitoring, bed in low locked position, tab alert in place, call light within reach, and side rails up x2. Will continue to monitor for changes, safety, and needs. 3539 Patient continues to rest with eyes open, he appears without acute distress. His facial features and body posture are both calm and relaxed. His respiratory state remains unchanged. Wife remains at his bedside and denies any current needs. Safety measures in place including, door open for continuous monitoring, bed in low locked position, tab alert in place, call light within reach, and side rails up x2. Will continue to monitor for changes, safety, and needs. FLACC 0/10.    3797 Patient given his scheduled Decadron 4mg IVP per MD orders without difficulty. Patient does not show any signs of distress. Wife at bedside holding his hand and denies any current needs. Safety measures in place including, door open for continuous monitoring, bed in low locked position, tab alert in place, call light within reach, and side rails up x2. Will continue to monitor for changes, safety, and needs. FLACC 0/10.     Report given to oncoming RN

## 2022-04-25 NOTE — PROGRESS NOTES
1830:  Patient report from Darcy Dudley RN and Sadie De La Garza, RN. Patient ID by name and . Patient is under GIP LOC for hospice diagnosis of Pancreatic Cancer to manage pain, dyspnea, and agitation. Discharge plans are for patient to remain in [facility] until demise. Discharge plans to be evaluated for potential LOC change. RN reviewed POC with CNA. Patient in bed with eyes open. No s/sx of distress observed. Wife at bedside. All safety measures in place. 2106:  Scheduled Haldol IV, Decadron IV administered as ordered. PRN Hydromorphone 1 mg IV administered for pain AEB some restless behavior noted. Will reassess. Wife asks that we hold off on repositioning at this time as it may cause him more pain. Wife remains at bedside. 2230:  Patient resting with eyes half open. No s/sx of pain, dyspnea, or agitation observed. Wife at bedside. 0045:  Patient resting with eyes half open. No s/sx of pain, dyspnea, or agitation observed. Wife at bedside. 0300:  Patient resting with eyes half open. No s/sx of pain, dyspnea, or agitation observed. Wife at bedside. 0500:  Scheduled Haldol IV and Decadron IV administered as ordered. Patient pulled up in bed and repositioned onto left side for comfort and to offset pressure. Wife remains at bedside. No s/sx of pain observed. 5913:  Patient shifting body in bed more. Medicated with PRN Hydromorphone 1 mg IV for pain. Will have first shift to reassess pain. Patient received two PRN doses of Hydromorphone for pain this shift in addition to scheduled medications. Report to Darcy Dudley RN and Sadie De La Garza, RN.

## 2022-04-25 NOTE — PROGRESS NOTES
Progress Note    Patient: Humaira Clements MRN: [id number]  SSN: [id number]    YOB: 1960  Age: 63 y.o. Sex: male      Admit Date: 4/20/2022    LOS: 5 days     Subjective:     Lethargic. No longer able to take po meds. Has required dilaudid x 3 for pain/dyspnea. Review of Systems:  Dyspnea at rest.    Objective:     Vitals:    04/22/22 1747 04/23/22 1653 04/24/22 0422 04/24/22 1713   BP: (!) 85/63 (!) 97/56 (!) 85/61 107/62   Pulse: (!) 106 (!) 107 (!) 106 (!) 103   Resp: 17 18 15 18   Temp: (!) 96.7 °F (35.9 °C) (!) 96.5 °F (35.8 °C) 97.9 °F (36.6 °C) (!) 96.2 °F (35.7 °C)        Intake and Output:  Current Shift: No intake/output data recorded. Last three shifts: 04/23 1901 - 04/25 0700  In: -   Out: 275 [Urine:275]    Physical Exam:   GENERAL: fatigued, cooperative, moderate distress, appears older than stated age, icteric, cachectic,   LUNG: Coarse breath sounds with labored respirations. HEART: regular rate and rhythm  ABDOMEN: soft, tender, distended. Ascites. Bowel sounds hypoactive. : Fontana catheter with dark erwin urine. EXTREMITIES:  extremities are dusky. Moderate edema, left greater than right. SKIN: Pale, dusky. Jaundice. Scattered ecchymoses on upper ext. NEUROLOGIC: Drowsy. Generalized weakness. Bedbound. PSYCHIATRIC: anxious    Lab/Data Review:  No new labs resulted in the last 24 hours. Assessment:     Principal Problem:    Refractory nausea and vomiting (4/20/2022)    Active Problems:    Malignant neoplasm of body of pancreas ([address] (4/20/2022)      Malignant ascites (4/20/2022)      Abdominal pain (4/20/2022)      Peritoneal carcinoma ([address] (4/20/2022)      Peripheral neuropathy due to and not concurrent with chemotherapy ([address] (4/20/2022)      Malignant cachexia ([facility] (4/20/2022)      Physical debility (4/20/2022)      Palpitations (5/24/2017)      Overview: Check TSH.       Cancer, pancreas, body ([facility] (4/20/2022)        Plan:     Current Facility-Administered Medications   Medication Dose Route Frequency    carboxymethylcellulose sodium (REFRESH LIQUIGEL) 1 % ophthalmic solution 1 Drop  1 Drop Both Eyes PRN    sodium chloride (NS) flush 10 mL  10 mL IntraVENous Q8H    fentaNYL (DURAGESIC) 25 mcg/hr patch 1 Patch  1 Patch TransDERmal Q72H    And    fentaNYL (DURAGESIC) 12 mcg/hr patch 1 Patch  1 Patch TransDERmal Q72H    haloperidol lactate (HALDOL) injection 4 mg  4 mg IntraVENous Q8H    Or    haloperidol lactate (HALDOL) injection 4 mg  4 mg IntraMUSCular Q8H    haloperidol lactate (HALDOL) injection 4 mg  4 mg IntraVENous Q4H PRN    sodium chloride (NS) flush 10 mL  10 mL InterCATHeter PRN    HYDROmorphone (DILAUDID) injection 1 mg  1 mg IntraVENous Q30MIN PRN    Or    HYDROmorphone (DILAUDID) injection 1 mg  1 mg SubCUTAneous Q30MIN PRN    haloperidol lactate (HALDOL) injection 4 mg  4 mg IntraVENous Q1H PRN    Or    haloperidol lactate (HALDOL) injection 4 mg  4 mg SubCUTAneous Q1H PRN    bisacodyL (DULCOLAX) suppository 10 mg  10 mg Rectal PRN    dexamethasone (DECADRON) 4 mg/mL injection 4 mg  4 mg IntraVENous Q8H    ondansetron (ZOFRAN) injection 4 mg  4 mg IntraVENous Q4H PRN     Admitted GIP with metastatic pancreatic cancer for management of pain, dyspnea and anxiety/agitation. DC po meds. Increase fentanyl patch to 37mcg/hr.     Signed By: Dominique Singh NP     April 25, 2022

## 2022-04-25 NOTE — PROGRESS NOTES
Follow up: Today Lv Leon was awake during 's visit. He was able to communicate with . His voice is soft. He reached out and gave  a fist bump. His wife and sister in law were at bedside. They were welcoming. Keyla acknowledged Lv Leon has had some changes since 's last visit.  asked Lv Leon if she could pray with him. He accepted offer of prayer. He thanked .

## 2022-04-25 NOTE — PROGRESS NOTES
Problem: Pressure Injury - Risk of  Goal: *Prevention of pressure injury  Description: Feliciano Clements will remain free from acquired pressure injuries AEB zero acquired pressure injuries during assessment of skin each shift during inpatient hospice stay. Outcome: Progressing Towards Goal  Note: Pressure Injury Interventions:  Sensory Interventions: Assess changes in LOC,Avoid rigorous massage over bony prominences,Check visual cues for pain,Float heels,Minimize linen layers    Moisture Interventions: Absorbent underpads,Apply protective barrier, creams and emollients,Limit adult briefs,Minimize layers,Moisture barrier    Activity Interventions: Assess need for specialty bed    Mobility Interventions: Assess need for specialty bed,Float heels    Nutrition Interventions: Document food/fluid/supplement intake    Friction and Shear Interventions: Apply protective barrier, creams and emollients,Lift sheet,Minimize layers                Problem: Risk for Falls  Goal: Free of falls during inpatient stay  Description: Feliciano Clements will remain free from falls AEB no injuries r/t falls each shift during inpatient hospice stay. Outcome: Progressing Towards Goal     Problem: Pain  Goal: Assess satisfaction of level of comfort and symptom control  Description: Feliciano Clements will exhibit decrease in pain AEB rating pain less than 3 on 1-10 scale or 3 FLACC score within each shift of receiving pain medication during inpatient hospice stay. Outcome: Progressing Towards Goal     Problem: Nausea/Vomiting (Adult)  Goal: *Absence of nausea/vomiting  Description: Feliciano Clements will exhibit decreased or eliminated episodes of Nausea/Vomiting AEB less than 2 PRN antiemetic medication administration each shift during inpatient hospice stay.     Outcome: Progressing Towards Goal     Problem: Infection - Risk of, Central Venous Catheter-Associated Bloodstream Infection  Goal: *Absence of infection signs and symptoms  Description: Patient Caryl Clements will remain free from infection R/T Central Venous Line AEB absence of signs and symptoms of infection each shift during inpatient hospice stay. Outcome: Progressing Towards Goal     Problem: Infection - Risk of, Urinary Catheter-Associated Urinary Tract Infection  Goal: *Absence of infection signs and symptoms  Description: Patient Caryl Clements will remain free from infection R/T urinary catheter AEB absence of signs and symptoms of infection each shift during inpatient hospice stay.      Outcome: Progressing Towards Goal

## 2022-04-25 NOTE — PROGRESS NOTES
6319 - Received report from JED KILGORE. Patient is currently under GIP level of care for Refractory nausea and vomiting with a diagnosis of pancreatic cancer admitted 4/20/2022. Will continue to assess need for change in LOC / discharge and collaborate with IDG team accordingly. Visualized the patient. Pt in bed resting with eyes open however he does not move or show any response when I walked into the room. His wife states that she feels he rested well last night and has bee the best rest that he has had in 3 weeks. Pts wife states no needs at this time. The bed is in a low, locked position. Side rails x 2 . Tab alarm in place, call light within reach. Pt is calm, resting/sleeping, pt responds to voice. No grimacing, no moaning, no agitation noted. FLACC-0,  Door left open for safety. Pts wife is at bedside    0704- Pts wife comes to nurses station stating that he was making some noises, when she asked if he was in pain, he motioned with his fingers that he was a little bit. Pt is able to shake his head yes, or no. He responds with a yes for pain to his left side rib cage, mid back and to left hip. Pt rated pain a 5/10, using yes and no. PRN pain medication given. Pts wife remains at bedside. 8575 - Reassessment of pain, Pt resting peacefully with eyes open in a supine position with HOB elevated. Pt responds to voice, by shaking his head that the pain medication worked and that he is not in any pain. 0/10. Pt continues to rest at this time. PTs wife states that she does not have nay needs currently. 1365 - Pt in bed resting with eyes open, does not respond upon entering. Dr. Mando Smith has rounded on the patient this am, pts wife does not have any further questions, and is appropriate for the patient condition. FLACC-0. No grimacing, no groaning, no agitation or restlessness. Pt is moving right and left arm with intent to scratch his face.  Pts wife asks for eye drops and will place those as soon as they are available. Pts elastic socks cut per wife's request.  No further needs at this time. 1208- Pts routine medication given, Haldol IVP. Fentanyl 25 mcg (removed) and increased to Fentanyl 37 mcg placed today on right arm. Pt requests to be repositioned in bed, head of bed elevated to a semi-fowlers position. No other needs at this time. Pt denies any pain at this time. 0/10 pain scale. Wife at bedside no needs at this time. 1426-  Spiritual Services at bedside at this time. Pts routine medication administered. Pt states that he is having abd pain at this time. Reclined patient back and elevated legs. Pt declined being turned to right side at this time. Pt seems to be restless with hands moving, leg twitching, shoulder twitching and pt seems startled at times. PRN medication given. Eye drops left at patients bedside. 1619- Pt resting in bed peacefully. No grimacing, moaning, groaning, agitation or restlessness. Pt does not arouse when entering the room. Per pts wife, he has been resting and the medication is effective. 1810 Pt is in bed resting supine with head elevated. Brother and Sister-in-law at bedside. Pt seems to be resting comfortably, his wife reports that he has been a small amount fidgety but nothing like he was. Pt does not respond to voice at this time, but did smile. The bed is in a low, locked position. Side rails x 2 . Tab alarm in place, call light within reach. Pt is calm, resting/sleeping, pt responds to voice. No grimacing, no moaning, no agitation noted. Report given to Patricia Calero RN.

## 2022-04-26 NOTE — PROGRESS NOTES
Problem: Pressure Injury - Risk of  Goal: *Prevention of pressure injury  Description: Patient Nicholas Clements will remain free from acquired pressure injuries AEB zero acquired pressure injuries during assessment of skin each shift during inpatient hospice stay. Outcome: Progressing Towards Goal  Note: Pressure Injury Interventions:  Sensory Interventions: Assess changes in LOC,Avoid rigorous massage over bony prominences,Check visual cues for pain,Float heels,Minimize linen layers    Moisture Interventions: Absorbent underpads,Apply protective barrier, creams and emollients,Limit adult briefs,Minimize layers,Moisture barrier    Activity Interventions: Assess need for specialty bed    Mobility Interventions: Assess need for specialty bed,Float heels    Nutrition Interventions: Document food/fluid/supplement intake    Friction and Shear Interventions: Apply protective barrier, creams and emollients,Lift sheet,Minimize layers                Problem: Pain  Goal: Assess satisfaction of level of comfort and symptom control  Description: Patient Nicholas Clements will exhibit decrease in pain AEB rating pain less than 3 on 1-10 scale or 3 FLACC score within each shift of receiving pain medication during inpatient hospice stay. Outcome: Progressing Towards Goal     Problem: Nausea/Vomiting (Adult)  Goal: *Absence of nausea/vomiting  Description: Patient Nicholas Clements will exhibit decreased or eliminated episodes of Nausea/Vomiting AEB less than 2 PRN antiemetic medication administration each shift during inpatient hospice stay. Outcome: Progressing Towards Goal     Problem: Infection - Risk of, Central Venous Catheter-Associated Bloodstream Infection  Goal: *Absence of infection signs and symptoms  Description: Patient Nicholas Clements will remain free from infection R/T Central Venous Line AEB absence of signs and symptoms of infection each shift during inpatient hospice stay.        Outcome: Progressing Towards Goal     Problem: Infection - Risk of, Urinary Catheter-Associated Urinary Tract Infection  Goal: *Absence of infection signs and symptoms  Description: Patient Daryn Clements will remain free from infection R/T urinary catheter AEB absence of signs and symptoms of infection each shift during inpatient hospice stay.      Outcome: Progressing Towards Goal

## 2022-04-26 NOTE — PROGRESS NOTES
Problem: Pressure Injury - Risk of  Goal: *Prevention of pressure injury  Description: Patient Nicole Clements will remain free from acquired pressure injuries AEB zero acquired pressure injuries during assessment of skin each shift during inpatient hospice stay. Outcome: Progressing Towards Goal  Note: Pressure Injury Interventions:  Sensory Interventions: Assess changes in LOC,Avoid rigorous massage over bony prominences,Check visual cues for pain,Float heels,Minimize linen layers    Moisture Interventions: Absorbent underpads,Apply protective barrier, creams and emollients,Limit adult briefs,Minimize layers,Moisture barrier    Activity Interventions: Assess need for specialty bed    Mobility Interventions: Assess need for specialty bed,Float heels    Nutrition Interventions: Document food/fluid/supplement intake    Friction and Shear Interventions: Apply protective barrier, creams and emollients,Lift sheet,Minimize layers                Problem: Risk for Falls  Goal: Free of falls during inpatient stay  Description: Feliciano Clements will remain free from falls AEB no injuries r/t falls each shift during inpatient hospice stay. Outcome: Progressing Towards Goal     Problem: Pain  Goal: Assess satisfaction of level of comfort and symptom control  Description: Feliciano Clements will exhibit decrease in pain AEB rating pain less than 3 on 1-10 scale or 3 FLACC score within each shift of receiving pain medication during inpatient hospice stay. Outcome: Progressing Towards Goal     Problem: Anticipatory Grief  Goal: Explore reactions to and verbalize acceptance of impending loss  Description: Patient/family/caregiver will explore reactions to and verbalize acceptance of impending loss. Outcome: Progressing Towards Goal     Problem: Nausea/Vomiting (Adult)  Goal: *Absence of nausea/vomiting  Description: Patient Nicole Clements will exhibit decreased or eliminated episodes of Nausea/Vomiting AEB less than 2 PRN antiemetic medication administration each shift during inpatient hospice stay. Outcome: Progressing Towards Goal     Problem: Infection - Risk of, Central Venous Catheter-Associated Bloodstream Infection  Goal: *Absence of infection signs and symptoms  Description: Feliciano Clements will remain free from infection R/T Central Venous Line AEB absence of signs and symptoms of infection each shift during inpatient hospice stay. Outcome: Progressing Towards Goal     Problem: End of Life Process  Goal: Demonstrate understanding of end of life processes  Description:   Feliciano Clements family/ caregiver will verbalize recall or return demonstration of emotional support and stress-reduction strategies and appropriate grieving on admission and ongoing as needed by the IDG members throughout the dying process during inpatient hospice stay. Outcome: Progressing Towards Goal     Problem: Discharge Planning  Goal: *Participates in discharge planning  Description: Feliciano Clements Pt, and pt's family/ care giver will receive support from community resources through the support of SW such as SNF placements, SW calling facilities for the family, updating the family/care giver on facilities, and sending out referrals for pt and family/ care giver. Outcome: Progressing Towards Goal     Problem: Infection - Risk of, Urinary Catheter-Associated Urinary Tract Infection  Goal: *Absence of infection signs and symptoms  Description: Feliciano Clements will remain free from infection R/T urinary catheter AEB absence of signs and symptoms of infection each shift during inpatient hospice stay.      Outcome: Progressing Towards Goal

## 2022-04-26 NOTE — PROGRESS NOTES
Progress Note    Patient: Inocencia Clements MRN: [id number]  SSN: [id number]    YOB: 1960  Age: 63 y.o. Sex: male      Admit Date: 4/20/2022    LOS: 6 days     Subjective:     Unresponsive. NPO. Has dilaudid x 4 for pain/dyspnea and haldol x 1 for agitation. Review of Systems:  Review of systems not obtained due to patient factors. Objective:     Vitals:    04/24/22 1713 04/25/22 1630 04/26/22 0511 04/26/22 1532   BP:  97/77 90/66 106/76   Pulse: (!) 103 (!) 106 (!) 101 (!) 105   Resp: 18 17 20 24   Temp: (!) 96.2 °F (35.7 °C) (!) 96.7 °F (35.9 °C) 97.3 °F (36.3 °C) 97.4 °F (36.3 °C)        Intake and Output:  Current Shift: No intake/output data recorded. Last three shifts: 04/25 0701 - 04/26 1900  In: -   Out: 61 [Urine:60]    Physical Exam:   GENERAL: moderate distress, appears older than stated age, icteric, cachectic, unresponsive  LUNG: Coarse breath sounds with labored respirations. Audible secretions. HEART: regular rate and rhythm  ABDOMEN: soft, distended. Ascites. Bowel sounds hypoactive. : Fontana catheter with dark erwin urine. EXTREMITIES:  extremities are dusky.  Moderate edema, left greater than right. SKIN: Pale, dusky. Jaundice. Scattered ecchymoses on upper ext.   NEUROLOGIC: Unresponsive with no lateralizing deficits. Bedbound.     Lab/Data Review:  No new labs resulted in the last 24 hours. Assessment:     Principal Problem:    Refractory nausea and vomiting (4/20/2022)    Active Problems:    Malignant neoplasm of body of pancreas ([address] (4/20/2022)      Malignant ascites (4/20/2022)      Abdominal pain (4/20/2022)      Peritoneal carcinoma ([address] (4/20/2022)      Peripheral neuropathy due to and not concurrent with chemotherapy ([address] (4/20/2022)      Malignant cachexia ([address] (4/20/2022)      Physical debility (4/20/2022)      Palpitations (5/24/2017)      Overview: Check TSH.       Cancer, pancreas, body ([facility] (4/20/2022)        Plan:     Current Facility-Administered Medications   Medication Dose Route Frequency    glycopyrrolate (ROBINUL) injection 0.2 mg  0.2 mg IntraVENous Q4H PRN    carboxymethylcellulose sodium (REFRESH LIQUIGEL) 1 % ophthalmic solution 1 Drop  1 Drop Both Eyes PRN    sodium chloride (NS) flush 10 mL  10 mL IntraVENous Q8H    fentaNYL (DURAGESIC) 25 mcg/hr patch 1 Patch  1 Patch TransDERmal Q72H    And    fentaNYL (DURAGESIC) 12 mcg/hr patch 1 Patch  1 Patch TransDERmal Q72H    haloperidol lactate (HALDOL) injection 4 mg  4 mg IntraVENous Q8H    Or    haloperidol lactate (HALDOL) injection 4 mg  4 mg IntraMUSCular Q8H    haloperidol lactate (HALDOL) injection 4 mg  4 mg IntraVENous Q4H PRN    sodium chloride (NS) flush 10 mL  10 mL InterCATHeter PRN    HYDROmorphone (DILAUDID) injection 1 mg  1 mg IntraVENous Q30MIN PRN    Or    HYDROmorphone (DILAUDID) injection 1 mg  1 mg SubCUTAneous Q30MIN PRN    haloperidol lactate (HALDOL) injection 4 mg  4 mg IntraVENous Q1H PRN    Or    haloperidol lactate (HALDOL) injection 4 mg  4 mg SubCUTAneous Q1H PRN    bisacodyL (DULCOLAX) suppository 10 mg  10 mg Rectal PRN    dexamethasone (DECADRON) 4 mg/mL injection 4 mg  4 mg IntraVENous Q8H    ondansetron (ZOFRAN) injection 4 mg  4 mg IntraVENous Q4H PRN       Admitted GIP with metastatic pancreatic cancer for management of pain, dyspnea and anxiety/agitation. Add glycopyrrolate 0.2mg IV Q4 prn for secretions.      Signed By: Daniella Hilton NP     April 26, 2022

## 2022-04-26 NOTE — PROGRESS NOTES
9912- Received report from Dougie Barron RN. Patient is currently under [facility] level of care for Refractory nausea and vomiting with a diagnosis of pancreatic cancer admitted 4/20/2022. Will continue to assess need for change in LOC / discharge and collaborate with IDG team accordingly. Visualized the patient. Pt in bed resting with eyes open however he does not move or show any response when I walked into the room. His wife states that she feels he rested well last night however seems to be getting more restless over the last few days. Pts wife states no needs at this time. The bed is in a low, locked position. Side rails x 2 . Tab alarm in place, call light within reach. Pt is calm, resting/sleeping, pt responds to voice. No grimacing, no moaning, no agitation noted. FLACC-0,   Pts wife is at bedside    0826- Pt in bed supine, resting comfortably, eyes open, no agitation, moaning, grimacing, or restlessness. Pt has several visitors at bedside including his brother, sister-in-law, wife,  and good friend. Patient is not interacting with any of these visitors. No needs at this for patient or family. 1027- Pt in bed supine with head of bed elevated. Pt appears to be resting comfortably. Per pts wife the only  Interaction that he had with visitors was reaching up to touch his brothers hand. Pt has not attempted to speak today. FLACC-0. No grimacing, moaning/groaning, agitation or restlessness. Pts wife continues at bedside. 1128- Routine medication administered. Pt in bed supine with head of bed elevated. No restlessness noted at this time, no grimacing/no moaning/groaning. Attempted to place eye drops in patients left eye and pt squinted the eye closed, no drops placed at this time. Pts wife is at bedside. No needs expressed at this time. 1250 - Pt resting in bed, with wife at bedside. Does not have any needs at this time    1421 - Routine medication given.  Pt has audible secretions, repositioned to right side as this helped previously. Pt appears to be resting peacefully, tolerated rotation well with no agitation or distress. Pts wife at bedside and states no needs at this time. 1620 - Change in status for patient, new onset of secretions. Notified Bere Denton NP for orders for Robinol. Per Luis Armando Hall he states that he did not think it was necessary for Robinol at this, and family agreed. He requested a manual blood pressure, CNA attempted and unable to obtain due to patient moving arms as if he did not want it comleted. 36- Pts brother, Carline Rodriguez, comes to the nurse's station with request to come to the room. Pt has passed at this time. patient without respirations, without pulses, no heart or lungs sounds auscultated. Pt not responsive to tactile or verbal stimuli.      1730-

## 2022-04-26 NOTE — DEATH NOTE
At 1730 patient without respirations, without pulses, no heart or lungs sounds auscultated. Pt not responsive to tactile or verbal stimuli.

## 2022-04-26 NOTE — PROGRESS NOTES
1632 Family used the call bell as pt was agitated and his breathing sounded \"bad\" as pt is gurgling as reported by the wife. Pt was medicated with Dilaudid 1mg IvP and Haldol 4mg IVP for symptom management. Pt's eyes open wide and he is lifting his left arm off the bed without purpose. Wife tearful at the bedside. Discussed with wife the amount of secretions in the patients throat is far less than the sound of the gurgling that she hears. Secretions too low if the chest to suction. Wife voiced understanding. Wife was encouraged to use the call bell for any needs, concerns or questions she may have.

## 2022-05-16 NOTE — HOME HEALTH
[facility] visit to assess for change in condition, Intractable n/v with pain. Arrived at residence to find patient supine in his bed with [address] elevated approximately 40 degrees. Nadege Floor states that he has been vomitting off and on all night. While completing assessment patient became nauseous and began vomitting bile intermittently. Keyla, his spouse went to  Rx for fentanyl patch. Over an hour the n/v episodes went from intermittent to almost continuous. Received orders from Dr. Afshan Barnes for GIP stay at [facility]. Nadege Floor was readied for transport to [facility] and transported via [facility]. Report called to [facility] charge nurse.